# Patient Record
Sex: FEMALE | Race: WHITE | Employment: FULL TIME | URBAN - METROPOLITAN AREA
[De-identification: names, ages, dates, MRNs, and addresses within clinical notes are randomized per-mention and may not be internally consistent; named-entity substitution may affect disease eponyms.]

---

## 2023-02-20 ENCOUNTER — HOSPITAL ENCOUNTER (EMERGENCY)
Age: 28
Discharge: HOME | End: 2023-02-20
Attending: EMERGENCY MEDICINE
Payer: COMMERCIAL

## 2023-02-20 ENCOUNTER — APPOINTMENT (OUTPATIENT)
Dept: CT IMAGING | Age: 28
End: 2023-02-20
Payer: COMMERCIAL

## 2023-02-20 VITALS
HEART RATE: 80 BPM | SYSTOLIC BLOOD PRESSURE: 140 MMHG | TEMPERATURE: 98.2 F | HEIGHT: 68 IN | OXYGEN SATURATION: 100 % | BODY MASS INDEX: 37.44 KG/M2 | DIASTOLIC BLOOD PRESSURE: 85 MMHG | WEIGHT: 247 LBS | RESPIRATION RATE: 20 BRPM

## 2023-02-20 DIAGNOSIS — R19.7 DIARRHEA, UNSPECIFIED TYPE: Primary | ICD-10-CM

## 2023-02-20 DIAGNOSIS — K62.5 BRBPR (BRIGHT RED BLOOD PER RECTUM): ICD-10-CM

## 2023-02-20 DIAGNOSIS — R91.1 PULMONARY NODULE: ICD-10-CM

## 2023-02-20 LAB
A/G RATIO: 1.6 (ref 1.1–2.2)
ALBUMIN SERPL-MCNC: 4.6 G/DL (ref 3.4–5)
ALP BLD-CCNC: 85 U/L (ref 40–129)
ALT SERPL-CCNC: 27 U/L (ref 10–40)
ANION GAP SERPL CALCULATED.3IONS-SCNC: 11 MMOL/L (ref 3–16)
AST SERPL-CCNC: 22 U/L (ref 15–37)
BACTERIA: ABNORMAL /HPF
BASOPHILS ABSOLUTE: 0.1 K/UL (ref 0–0.2)
BASOPHILS RELATIVE PERCENT: 0.9 %
BILIRUB SERPL-MCNC: 0.4 MG/DL (ref 0–1)
BILIRUBIN URINE: ABNORMAL
BLOOD, URINE: ABNORMAL
BUN BLDV-MCNC: 11 MG/DL (ref 7–20)
CALCIUM SERPL-MCNC: 10.1 MG/DL (ref 8.3–10.6)
CHLORIDE BLD-SCNC: 102 MMOL/L (ref 99–110)
CLARITY: ABNORMAL
CO2: 26 MMOL/L (ref 21–32)
COLOR: YELLOW
CREAT SERPL-MCNC: 0.9 MG/DL (ref 0.6–1.1)
EOSINOPHILS ABSOLUTE: 0.1 K/UL (ref 0–0.6)
EOSINOPHILS RELATIVE PERCENT: 1.6 %
EPITHELIAL CELLS, UA: ABNORMAL /HPF (ref 0–5)
GFR SERPL CREATININE-BSD FRML MDRD: >60 ML/MIN/{1.73_M2}
GLUCOSE BLD-MCNC: 98 MG/DL (ref 70–99)
GLUCOSE URINE: NEGATIVE MG/DL
HCG(URINE) PREGNANCY TEST: NEGATIVE
HCT VFR BLD CALC: 40.9 % (ref 36–48)
HEMOGLOBIN: 14 G/DL (ref 12–16)
KETONES, URINE: ABNORMAL MG/DL
LEUKOCYTE ESTERASE, URINE: NEGATIVE
LIPASE: 16 U/L (ref 13–60)
LYMPHOCYTES ABSOLUTE: 2 K/UL (ref 1–5.1)
LYMPHOCYTES RELATIVE PERCENT: 22.3 %
MCH RBC QN AUTO: 30.2 PG (ref 26–34)
MCHC RBC AUTO-ENTMCNC: 34.1 G/DL (ref 31–36)
MCV RBC AUTO: 88.4 FL (ref 80–100)
MICROSCOPIC EXAMINATION: YES
MONOCYTES ABSOLUTE: 0.5 K/UL (ref 0–1.3)
MONOCYTES RELATIVE PERCENT: 6 %
NEUTROPHILS ABSOLUTE: 6.3 K/UL (ref 1.7–7.7)
NEUTROPHILS RELATIVE PERCENT: 69.2 %
NITRITE, URINE: NEGATIVE
OCCULT BLOOD DIAGNOSTIC: ABNORMAL
PDW BLD-RTO: 13.3 % (ref 12.4–15.4)
PH UA: 6 (ref 5–8)
PLATELET # BLD: 305 K/UL (ref 135–450)
PMV BLD AUTO: 7.3 FL (ref 5–10.5)
POTASSIUM REFLEX MAGNESIUM: 4.2 MMOL/L (ref 3.5–5.1)
PROTEIN UA: ABNORMAL MG/DL
RBC # BLD: 4.63 M/UL (ref 4–5.2)
RBC UA: ABNORMAL /HPF (ref 0–4)
SODIUM BLD-SCNC: 139 MMOL/L (ref 136–145)
SPECIFIC GRAVITY UA: 1.02 (ref 1–1.03)
TOTAL PROTEIN: 7.5 G/DL (ref 6.4–8.2)
URINE REFLEX TO CULTURE: YES
URINE TYPE: ABNORMAL
UROBILINOGEN, URINE: 0.2 E.U./DL
WBC # BLD: 9.1 K/UL (ref 4–11)
WBC UA: ABNORMAL /HPF (ref 0–5)

## 2023-02-20 PROCEDURE — 87086 URINE CULTURE/COLONY COUNT: CPT

## 2023-02-20 PROCEDURE — 83690 ASSAY OF LIPASE: CPT

## 2023-02-20 PROCEDURE — 74177 CT ABD & PELVIS W/CONTRAST: CPT

## 2023-02-20 PROCEDURE — 85025 COMPLETE CBC W/AUTO DIFF WBC: CPT

## 2023-02-20 PROCEDURE — 99285 EMERGENCY DEPT VISIT HI MDM: CPT

## 2023-02-20 PROCEDURE — 84703 CHORIONIC GONADOTROPIN ASSAY: CPT

## 2023-02-20 PROCEDURE — 6360000004 HC RX CONTRAST MEDICATION: Performed by: EMERGENCY MEDICINE

## 2023-02-20 PROCEDURE — 82270 OCCULT BLOOD FECES: CPT

## 2023-02-20 PROCEDURE — 81001 URINALYSIS AUTO W/SCOPE: CPT

## 2023-02-20 PROCEDURE — 80053 COMPREHEN METABOLIC PANEL: CPT

## 2023-02-20 RX ORDER — FLUOXETINE HYDROCHLORIDE 20 MG/1
80 CAPSULE ORAL DAILY
COMMUNITY
Start: 2015-02-27

## 2023-02-20 RX ORDER — BUPROPION HYDROCHLORIDE 150 MG/1
150 TABLET, EXTENDED RELEASE ORAL DAILY
COMMUNITY

## 2023-02-20 RX ADMIN — IOPAMIDOL 75 ML: 755 INJECTION, SOLUTION INTRAVENOUS at 17:21

## 2023-02-20 ASSESSMENT — PAIN SCALES - GENERAL: PAINLEVEL_OUTOF10: 6

## 2023-02-20 ASSESSMENT — PAIN DESCRIPTION - ORIENTATION: ORIENTATION: LOWER

## 2023-02-20 ASSESSMENT — PAIN - FUNCTIONAL ASSESSMENT
PAIN_FUNCTIONAL_ASSESSMENT: NONE - DENIES PAIN
PAIN_FUNCTIONAL_ASSESSMENT: 0-10

## 2023-02-20 ASSESSMENT — PAIN DESCRIPTION - LOCATION: LOCATION: ABDOMEN

## 2023-02-20 ASSESSMENT — PAIN DESCRIPTION - FREQUENCY: FREQUENCY: CONTINUOUS

## 2023-02-20 ASSESSMENT — PAIN DESCRIPTION - PAIN TYPE: TYPE: ACUTE PAIN

## 2023-02-20 NOTE — ED NOTES
Nursing assist MD with rectal exam, sample obtained patient tolerated well.      Costa Barraza RN  02/20/23 0731

## 2023-02-20 NOTE — ED PROVIDER NOTES
Emergency Department Provider Note  Location: [address] [place] [address]  2/20/2023     Patient Identification  Arleen Sun is a 28 y.o. female    Chief Complaint  Abdominal Pain          HPI  (History provided by patient)  Patient is a 62-year-old female who presents with abdominal cramping diarrhea and bright red blood per rectum started earlier this morning. No history of these symptoms. No history of hemorrhoids. Felt well yesterday. Woke up with severe generalized cramping more so on the left side. No exacerbating alleviating factors. Also lower back cramping. She reports that she developed multiple episodes of loose stool and became grossly bloody and now to the point where she is only having bright red blood coming out. No fevers no chills. No urinary symptoms. Last menstrual cycle was 2 weeks ago. She denies any vaginal bleeding or abnormal discharge. I have reviewed the following nursing documentation:  Allergies: No Known Allergies    Past medical history:  has no past medical history on file. Past surgical history:  has no past surgical history on file. Home medications:   Prior to Admission medications    Medication Sig Start Date End Date Taking? Authorizing Provider   FLUoxetine (PROZAC) 20 MG capsule Take 80 mg by mouth daily 2/27/15  Yes Historical Provider, MD   loperamide (ANTI-DIARRHEAL) 1 MG/5ML solution Take 5 mLs by mouth 3 times daily as needed for Diarrhea (only as needed for persistent diarrhea) 2/20/23 2/27/23 Yes Phu Mariee MD   buPROPion (WELLBUTRIN SR) 150 MG extended release tablet Take 150 mg by mouth daily    Historical Provider, MD       Social history:  reports that she has been smoking cigarettes. She has never used smokeless tobacco. She reports current alcohol use. She reports current drug use. Drug: Marijuana Shellye Burkitt). Family history:  History reviewed. No pertinent family history.           Exam  ED Triage Vitals [02/20/23 9269] BP Temp Temp Source Heart Rate Resp SpO2 Height Weight   (!) 140/85 98.2 °F (36.8 °C) Oral 80 20 100 % 5' 8\" (1.727 m) 247 lb (112 kg)       Physical Exam  Vitals and nursing note reviewed. Constitutional:       General: She is not in acute distress. Appearance: She is well-developed. HENT:      Head: Normocephalic and atraumatic. Nose: Nose normal. No congestion. Eyes:      General: No scleral icterus. Extraocular Movements: Extraocular movements intact. Cardiovascular:      Rate and Rhythm: Normal rate and regular rhythm. Heart sounds: No murmur heard. Pulmonary:      Effort: Pulmonary effort is normal.      Breath sounds: Normal breath sounds. Abdominal:      General: There is no distension. Palpations: Abdomen is soft. Comments: Mildly tender periumbilically otherwise no guarding or rebound no CVA tenderness   Genitourinary:     Comments: Trace blood no significant stool sample no obvious hemorrhoids no anal fissure noted  Musculoskeletal:         General: No deformity. Normal range of motion. Cervical back: Normal range of motion and neck supple. Skin:     General: Skin is warm. Findings: No rash. Neurological:      Mental Status: She is alert and oriented to person, place, and time. Motor: No abnormal muscle tone. Coordination: Coordination normal.   Psychiatric:         Mood and Affect: Mood normal.         Behavior: Behavior normal.         MDM/ED Course    ED Medication Orders (From admission, onward)      Start Ordered     Status Ordering Provider    02/20/23 1708 02/20/23 1709  iopamidol (ISOVUE-370) 76 % injection 75 mL  IMG ONCE PRN         Last MAR action: Given - by Nick Riddle on 02/20/23 at [address] [place] [address], [id number] Rahul VILLALPANDO                Radiology  CT ABDOMEN PELVIS W IV CONTRAST Additional Contrast? None    Result Date: 2/20/2023  CT abdomen and pelvis with contrast HISTORY: Diarrhea. Abdominal pain. Bright red blood per rectum.  COMPARISON: none CONTRAST:  75 mL Isovue-370 intravenous  TECHNIQUE: Individualized dose optimization technique was used in order to meet ALARA standards for radiation dose reduction. In addition to vendor specific dose reduction algorithms, the dose reduction techniques vary based on the specific scanner utilized but frequently include automated exposure control, adjustment of the mA and/or kV according to patient size, and use of iterative reconstruction technique. COMMENTS: Mild degenerative changes in the spine. 5 mm subpleural pulmonary nodule right lower lobe image 2-19. In the absence of risk factor for lung cancer no follow-up is required. If there are risk factors then 12 month chest CT can be obtained. There is colonic diverticulosis. Normal appendix. No anorectal abnormality seen. No lymphadenopathy. Gallbladder is within normal limits. Solid abdominal organs are otherwise unremarkable. Bladder and uterus within normal limits. No cause for acute pain or rectal bleeding identified. 5 mm pulmonary nodule. Bedside Ultrasound  No results found.        Labs  Results for orders placed or performed during the hospital encounter of 02/20/23   Urinalysis with Reflex to Culture    Specimen: Urine   Result Value Ref Range    Color, UA Yellow Straw/Yellow    Clarity, UA SL CLOUDY (A) Clear    Glucose, Ur Negative Negative mg/dL    Bilirubin Urine SMALL (A) Negative    Ketones, Urine TRACE (A) Negative mg/dL    Specific Gravity, UA 1.025 1.005 - 1.030    Blood, Urine TRACE-LYSED (A) Negative    pH, UA 6.0 5.0 - 8.0    Protein, UA TRACE (A) Negative mg/dL    Urobilinogen, Urine 0.2 <2.0 E.U./dL    Nitrite, Urine Negative Negative    Leukocyte Esterase, Urine Negative Negative    Microscopic Examination YES     Urine Type NotGiven     Urine Reflex to Culture Yes    Urine Preg (Lab)   Result Value Ref Range    HCG(Urine) Pregnancy Test Negative Detects HCG level >20 MIU/mL   CBC with Auto Differential   Result Value Ref Range    WBC 9.1 4.0 - 11.0 K/uL    RBC 4.63 4.00 - 5.20 M/uL    Hemoglobin 14.0 12.0 - 16.0 g/dL    Hematocrit 40.9 36.0 - 48.0 %    MCV 88.4 80.0 - 100.0 fL    MCH 30.2 26.0 - 34.0 pg    MCHC 34.1 31.0 - 36.0 g/dL    RDW 13.3 12.4 - 15.4 %    Platelets 528 216 - 000 K/uL    MPV 7.3 5.0 - 10.5 fL    Neutrophils % 69.2 %    Lymphocytes % 22.3 %    Monocytes % 6.0 %    Eosinophils % 1.6 %    Basophils % 0.9 %    Neutrophils Absolute 6.3 1.7 - 7.7 K/uL    Lymphocytes Absolute 2.0 1.0 - 5.1 K/uL    Monocytes Absolute 0.5 0.0 - 1.3 K/uL    Eosinophils Absolute 0.1 0.0 - 0.6 K/uL    Basophils Absolute 0.1 0.0 - 0.2 K/uL   CMP w/ Reflex to MG   Result Value Ref Range    Sodium 139 136 - 145 mmol/L    Potassium reflex Magnesium 4.2 3.5 - 5.1 mmol/L    Chloride 102 99 - 110 mmol/L    CO2 26 21 - 32 mmol/L    Anion Gap 11 3 - 16    Glucose 98 70 - 99 mg/dL    BUN 11 7 - 20 mg/dL    Creatinine 0.9 0.6 - 1.1 mg/dL    Est, Glom Filt Rate >60 >60    Calcium 10.1 8.3 - 10.6 mg/dL    Total Protein 7.5 6.4 - 8.2 g/dL    Albumin 4.6 3.4 - 5.0 g/dL    Albumin/Globulin Ratio 1.6 1.1 - 2.2    Total Bilirubin 0.4 0.0 - 1.0 mg/dL    Alkaline Phosphatase 85 40 - 129 U/L    ALT 27 10 - 40 U/L    AST 22 15 - 37 U/L   Lipase   Result Value Ref Range    Lipase 16.0 13.0 - 60.0 U/L   Blood Occult Stool Diagnostic   Result Value Ref Range    Occult Blood Diagnostic Result: POSITIVE  Normal range: Negative   (A)    Microscopic Urinalysis   Result Value Ref Range    WBC, UA 10-20 (A) 0 - 5 /HPF    RBC, UA 0-2 0 - 4 /HPF    Epithelial Cells, UA 11-20 (A) 0 - 5 /HPF    Bacteria, UA 4+ (A) None Seen /HPF         Procedures  Procedures      Patient seen and evaluated. Relevant records reviewed. - Patient is 28 y.o. female presented for acute diarrhea abdominal cramping and bright red blood per rectum in setting or chronic conditions none.   - Exam showed overall well-appearing female no acute distress vitals notable for borderline elevated blood pressure systolic 630 otherwise vitals within normal limits. She has a soft abdomen mild tenderness. She has trace red blood on LUCI but no melena no evidence of hemorrhoid or ongoing bleed. - Patient was placed on telemetry during his/her ED stay and no malignant dysrhythmia observed. - Diagnostic studies reviewed. - Lab: CBC does not show any evidence of anemia or thrombocytopenia did not show clinically significant pathology. CT abdomen pelvis does not show any obvious inflammatory pathology diverticulitis mass or any other concerning finding. Incidental pulmonary nodules noted which I discussed with the patient.  -At this point I feel that the patient is low risk for decompensated lower GI bleed and is appropriate for discharge with GI follow-up. - Is this patient to be included in the SEP-1 Core Measure due to severe sepsis or septic shock? No   Exclusion criteria - the patient is NOT to be included for SEP-1 Core Measure due to: Infection is not suspected    - Patient was given    ED Medication Orders (From admission, onward)      Start Ordered     Status Ordering Provider    02/20/23 1708 02/20/23 1709  iopamidol (ISOVUE-370) 76 % injection 75 mL  IMG ONCE PRN         Last MAR action: Given - by Juana Signs on 02/20/23 at [address], Kacey [place] [address] discussed the results with patient/family including incidental findings. We agreed to discharge with pending stool tests and GI follow-up. Patient has insurance and reports that she has PCP and is able to arrange transport for follow-up  - At this point I do not see indication for further work-up in the ER, as it is unlikely  and poses more risk than benefit. - Return precautions also discussed. Patient/family verbalized understanding of care plan and agreeable to plan. Clinical Impression:  1. Diarrhea, unspecified type    2. BRBPR (bright red blood per rectum)    3.  Pulmonary nodule Disposition:  Discharge to home in good condition. Blood pressure (!) 140/85, pulse 80, temperature 98.2 °F (36.8 °C), temperature source Oral, resp. rate 20, height 5' 8\" (1.727 m), weight 247 lb (112 kg), SpO2 100 %. Patient was given scripts for the following medications. I counseled patient how to take these medications. New Prescriptions    LOPERAMIDE (ANTI-DIARRHEAL) 1 MG/5ML SOLUTION    Take 5 mLs by mouth 3 times daily as needed for Diarrhea (only as needed for persistent diarrhea)     Modified Medications    No medications on file       Disposition referral (if applicable):  Porsche Loja MD  [address] [facility], [address]    Schedule an appointment as soon as possible for a visit       I, Pat Pineda, am the primary attending of record and contributed the majority of evaluation and treatment of emergent care for this encounter. Total critical care time is 0 minutes, which excludes separately billable procedures and updating family. Time spent is specifically for management of the presenting complaint and symptoms initially, direct bedside care, reevaluation, review of records, and consultation. There was a high probability of clinically significant life-threatening deterioration in the patient's condition, which required my urgent intervention. This chart was generated in part by using Dragon Dictation system and may contain errors related to that system including errors in grammar, punctuation, and spelling, as well as words and phrases that may be inappropriate. If there are any questions or concerns please feel free to contact the dictating provider for clarification.      Phu Mariee MD  [address] Mcgee MD  02/20/23 3418

## 2023-02-20 NOTE — ED NOTES
Patient given prescription,discharge instructions verbal and written, patient verbalized understanding. Alert/oriented X4, Clear speech.   Patient exhibits no distress, ambulates with steady gait per self leaving unit, no further request.      Mau Flores RN  02/20/23 5968

## 2023-02-20 NOTE — DISCHARGE INSTRUCTIONS
You were seen in the emergency department for diarrhea and bright red blood per rectum. Your lab work and your imaging is overall reassuring. We are prescribing you an antidiarrhea medication which she should take only as needed for severe symptoms otherwise recommend that you hydrate as we discussed. You have follow-up stool studies that you should submit any need to follow-up with a GI doctor. Return to emergency department with any emergent concerns as we discussed. Lung Nodules    Your x-ray or CT scan shows a nodule (\"spot\") on your lung. This will require follow-up for further evaluation. Please call your Primary Care Physician (PCP) if you have already established one and you confirmed your PCP during your ER visit today. If you do not have a PCP, please call the [address] [place] [address] scheduling number to schedule your Emergency Department follow up visit. Please mention that you are scheduling an \"ER follow up visit\" for a lung nodule. Learning About Lung Nodules  What is a lung nodule? A lung nodule is a growth in the lung. A single nodule surrounded by lung tissue is called a solitary pulmonary nodule. A lung nodule might not cause any symptoms. Your doctor may have found one or more nodules on your lung when you were having a chest X-ray or CT scan. Or it may have been found during a lung cancer screening. A lung nodule may be caused by an old infection or cancer. It might also be a noncancerous growth. Lung nodules can cause a screening to give an abnormal result. Most nodules do not cause any harm. But without further tests, your doctor can't tell whether an abnormal finding is cancer, a harmless nodule, or something else. What can you expect when you have a lung nodule? Your doctor will look at several risk factors to see how likely it is that the nodule is cancer. He or she will look at: Whether you smoke or have ever smoked.   Your age and your family's medical history. Whether you have ever had lung cancer. The size and shape of the nodule. Whether the nodule has changed in size. Your doctor may look at past chest X-rays or CT scans, if available, and compare them. Or you may have a series of CT scans to see if the nodule grows over time. What happens next depends on the risk of the nodule being cancer. If you have no risk factors and the nodule is small, your doctor may advise doing nothing. If the risk is small, your doctor may schedule follow-up appointments and tests. You may have more CT scans later to see if the nodule is growing. If the nodule hasn't changed in 3 to 6 months, you may have CT scans every year. If it hasn't changed in 2 years, you may not need any more tests. If there's a higher risk of cancer, your doctor may:  Do a PET scan, which may help tell if the nodule is cancerous or not. Take a sample of tissue from the nodule for testing. This is called a biopsy. Remove the nodule with surgery. Follow-up care is a key part of your treatment and safety. Be sure to make and go to all appointments, and call your doctor if you are having problems. It's also a good idea to know your test results and keep a list of the medicines you take. What is the next step in evaluation of a lung nodule? Below are the recommended guidelines for management of pulmonary nodules, you can discuss these recommendations at your follow-up appointment:     Nodule size less than or equal to ?4 mm  In a low-risk patient, no follow-up needed. In a high-risk patient, follow-up CT at 12 months; if unchanged, no further follow-up. Nodule size equals >4-6 mm  In a low-risk patient, follow-up CT at 12 months; if unchanged, no further follow-up. In a high-risk patient, initial follow-up CT at 6-12 months then at 18-24 months if no change. Nodule size equals >6-8 mm  In a low-risk patient, initial follow-up CT at 6-12 months then at 18-24 months if no change.   In a high-risk patient, initial follow-up CT at 3-6 months then at 9-12 months and 24 months if no change. Nodule size greater than >8 mm  In low-risk and high-risk patients follow-up CT at around 3, 9, and 24 months, contrast-enhanced CT, PET, and/or biopsy.

## 2023-02-20 NOTE — ED NOTES
Patient to ed with complaints of bright rectal blood after multiple loose stools today, patient also reports some lower abd cramping.      Tk Salas RN  02/20/23 1077

## 2023-02-21 LAB — URINE CULTURE, ROUTINE: NORMAL

## 2024-03-24 ENCOUNTER — HOSPITAL ENCOUNTER (INPATIENT)
Age: 29
LOS: 4 days | Discharge: HOME | DRG: 152 | End: 2024-03-28
Attending: EMERGENCY MEDICINE | Admitting: INTERNAL MEDICINE
Payer: COMMERCIAL

## 2024-03-24 ENCOUNTER — APPOINTMENT (OUTPATIENT)
Dept: GENERAL RADIOLOGY | Age: 29
DRG: 152 | End: 2024-03-24
Payer: COMMERCIAL

## 2024-03-24 ENCOUNTER — APPOINTMENT (OUTPATIENT)
Dept: CT IMAGING | Age: 29
DRG: 152 | End: 2024-03-24
Payer: COMMERCIAL

## 2024-03-24 DIAGNOSIS — J96.01 ACUTE HYPOXIC RESPIRATORY FAILURE (HCC): ICD-10-CM

## 2024-03-24 DIAGNOSIS — R06.1 STRIDOR: Primary | ICD-10-CM

## 2024-03-24 DIAGNOSIS — J06.9 ACUTE UPPER RESPIRATORY INFECTION: ICD-10-CM

## 2024-03-24 PROBLEM — R06.00 DYSPNEA: Status: ACTIVE | Noted: 2024-03-24

## 2024-03-24 LAB
ALBUMIN SERPL-MCNC: 4.5 G/DL (ref 3.4–5)
ALBUMIN/GLOB SERPL: 1.5 {RATIO} (ref 1.1–2.2)
ALP SERPL-CCNC: 85 U/L (ref 40–129)
ALT SERPL-CCNC: 23 U/L (ref 10–40)
ANION GAP SERPL CALCULATED.3IONS-SCNC: 15 MMOL/L (ref 3–16)
AST SERPL-CCNC: 28 U/L (ref 15–37)
BASE EXCESS BLDA CALC-SCNC: -2.1 MMOL/L (ref -3–3)
BASOPHILS # BLD: 0.1 K/UL (ref 0–0.2)
BASOPHILS NFR BLD: 0.7 %
BILIRUB SERPL-MCNC: <0.2 MG/DL (ref 0–1)
BUN SERPL-MCNC: 8 MG/DL (ref 7–20)
CALCIUM SERPL-MCNC: 9.3 MG/DL (ref 8.3–10.6)
CHLORIDE SERPL-SCNC: 101 MMOL/L (ref 99–110)
CO2 BLDA-SCNC: 51.7 MMOL/L
CO2 SERPL-SCNC: 19 MMOL/L (ref 21–32)
COHGB MFR BLDA: 0.9 % (ref 0–1.5)
CREAT SERPL-MCNC: 0.7 MG/DL (ref 0.6–1.1)
DEPRECATED RDW RBC AUTO: 13.8 % (ref 12.4–15.4)
EOSINOPHIL # BLD: 0.5 K/UL (ref 0–0.6)
EOSINOPHIL NFR BLD: 3.6 %
FLUAV RNA RESP QL NAA+PROBE: NOT DETECTED
FLUBV RNA RESP QL NAA+PROBE: NOT DETECTED
GFR SERPLBLD CREATININE-BSD FMLA CKD-EPI: >60 ML/MIN/{1.73_M2}
GLUCOSE SERPL-MCNC: 140 MG/DL (ref 70–99)
HCG SERPL QL: NEGATIVE
HCO3 BLDA-SCNC: 22 MMOL/L (ref 21–29)
HCT VFR BLD AUTO: 44.3 % (ref 36–48)
HGB BLD-MCNC: 14.8 G/DL (ref 12–16)
HGB BLDA-MCNC: 13.9 G/DL (ref 12–16)
LACTATE BLDV-SCNC: 2.6 MMOL/L (ref 0.4–1.9)
LYMPHOCYTES # BLD: 2.4 K/UL (ref 1–5.1)
LYMPHOCYTES NFR BLD: 17 %
MCH RBC QN AUTO: 30.4 PG (ref 26–34)
MCHC RBC AUTO-ENTMCNC: 33.5 G/DL (ref 31–36)
MCV RBC AUTO: 90.7 FL (ref 80–100)
METHGB MFR BLDA: 0.3 %
MONOCYTES # BLD: 0.6 K/UL (ref 0–1.3)
MONOCYTES NFR BLD: 4.6 %
NEUTROPHILS # BLD: 10.5 K/UL (ref 1.7–7.7)
NEUTROPHILS NFR BLD: 74.1 %
O2 THERAPY: ABNORMAL
PCO2 BLDA: 35 MMHG (ref 35–45)
PH BLDA: 7.41 [PH] (ref 7.35–7.45)
PLATELET # BLD AUTO: 370 K/UL (ref 135–450)
PMV BLD AUTO: 7.5 FL (ref 5–10.5)
PO2 BLDA: 124 MMHG (ref 75–108)
POTASSIUM SERPL-SCNC: 4.5 MMOL/L (ref 3.5–5.1)
PROT SERPL-MCNC: 7.6 G/DL (ref 6.4–8.2)
RBC # BLD AUTO: 4.88 M/UL (ref 4–5.2)
REPORT: NORMAL
RESP PATH DNA+RNA PNL NPH NAA+NON-PROBE: NORMAL
S PYO AG THROAT QL: NEGATIVE
SAO2 % BLDA: 99.6 %
SARS-COV-2 RNA RESP QL NAA+PROBE: NOT DETECTED
SODIUM SERPL-SCNC: 135 MMOL/L (ref 136–145)
WBC # BLD AUTO: 14.2 K/UL (ref 4–11)

## 2024-03-24 PROCEDURE — 6370000000 HC RX 637 (ALT 250 FOR IP): Performed by: INTERNAL MEDICINE

## 2024-03-24 PROCEDURE — 2700000000 HC OXYGEN THERAPY PER DAY

## 2024-03-24 PROCEDURE — 99285 EMERGENCY DEPT VISIT HI MDM: CPT

## 2024-03-24 PROCEDURE — 87040 BLOOD CULTURE FOR BACTERIA: CPT

## 2024-03-24 PROCEDURE — 6360000002 HC RX W HCPCS: Performed by: STUDENT IN AN ORGANIZED HEALTH CARE EDUCATION/TRAINING PROGRAM

## 2024-03-24 PROCEDURE — 36415 COLL VENOUS BLD VENIPUNCTURE: CPT

## 2024-03-24 PROCEDURE — 96361 HYDRATE IV INFUSION ADD-ON: CPT

## 2024-03-24 PROCEDURE — 2580000003 HC RX 258: Performed by: STUDENT IN AN ORGANIZED HEALTH CARE EDUCATION/TRAINING PROGRAM

## 2024-03-24 PROCEDURE — 87636 SARSCOV2 & INF A&B AMP PRB: CPT

## 2024-03-24 PROCEDURE — 71045 X-RAY EXAM CHEST 1 VIEW: CPT

## 2024-03-24 PROCEDURE — 94761 N-INVAS EAR/PLS OXIMETRY MLT: CPT

## 2024-03-24 PROCEDURE — 87081 CULTURE SCREEN ONLY: CPT

## 2024-03-24 PROCEDURE — 96365 THER/PROPH/DIAG IV INF INIT: CPT

## 2024-03-24 PROCEDURE — 85025 COMPLETE CBC W/AUTO DIFF WBC: CPT

## 2024-03-24 PROCEDURE — 2580000003 HC RX 258: Performed by: INTERNAL MEDICINE

## 2024-03-24 PROCEDURE — 94640 AIRWAY INHALATION TREATMENT: CPT

## 2024-03-24 PROCEDURE — 2000000000 HC ICU R&B

## 2024-03-24 PROCEDURE — 96375 TX/PRO/DX INJ NEW DRUG ADDON: CPT

## 2024-03-24 PROCEDURE — 84703 CHORIONIC GONADOTROPIN ASSAY: CPT

## 2024-03-24 PROCEDURE — 0202U NFCT DS 22 TRGT SARS-COV-2: CPT

## 2024-03-24 PROCEDURE — 82803 BLOOD GASES ANY COMBINATION: CPT

## 2024-03-24 PROCEDURE — 87880 STREP A ASSAY W/OPTIC: CPT

## 2024-03-24 PROCEDURE — 2500000003 HC RX 250 WO HCPCS: Performed by: INTERNAL MEDICINE

## 2024-03-24 PROCEDURE — 99223 1ST HOSP IP/OBS HIGH 75: CPT | Performed by: STUDENT IN AN ORGANIZED HEALTH CARE EDUCATION/TRAINING PROGRAM

## 2024-03-24 PROCEDURE — 6370000000 HC RX 637 (ALT 250 FOR IP): Performed by: FAMILY MEDICINE

## 2024-03-24 PROCEDURE — 93005 ELECTROCARDIOGRAM TRACING: CPT | Performed by: EMERGENCY MEDICINE

## 2024-03-24 PROCEDURE — 6360000002 HC RX W HCPCS: Performed by: PHYSICIAN ASSISTANT

## 2024-03-24 PROCEDURE — 2580000003 HC RX 258: Performed by: PHYSICIAN ASSISTANT

## 2024-03-24 PROCEDURE — 6360000004 HC RX CONTRAST MEDICATION: Performed by: EMERGENCY MEDICINE

## 2024-03-24 PROCEDURE — 80053 COMPREHEN METABOLIC PANEL: CPT

## 2024-03-24 PROCEDURE — 83605 ASSAY OF LACTIC ACID: CPT

## 2024-03-24 PROCEDURE — 70491 CT SOFT TISSUE NECK W/DYE: CPT

## 2024-03-24 PROCEDURE — 6360000002 HC RX W HCPCS: Performed by: INTERNAL MEDICINE

## 2024-03-24 RX ORDER — IPRATROPIUM BROMIDE AND ALBUTEROL SULFATE 2.5; .5 MG/3ML; MG/3ML
1 SOLUTION RESPIRATORY (INHALATION)
Status: DISCONTINUED | OUTPATIENT
Start: 2024-03-24 | End: 2024-03-24

## 2024-03-24 RX ORDER — 0.9 % SODIUM CHLORIDE 0.9 %
1000 INTRAVENOUS SOLUTION INTRAVENOUS ONCE
Status: COMPLETED | OUTPATIENT
Start: 2024-03-24 | End: 2024-03-24

## 2024-03-24 RX ORDER — ENOXAPARIN SODIUM 100 MG/ML
40 INJECTION SUBCUTANEOUS DAILY
Status: DISCONTINUED | OUTPATIENT
Start: 2024-03-24 | End: 2024-03-25

## 2024-03-24 RX ORDER — SODIUM CHLORIDE FOR INHALATION 0.9 %
3 VIAL, NEBULIZER (ML) INHALATION EVERY 4 HOURS PRN
Status: DISCONTINUED | OUTPATIENT
Start: 2024-03-24 | End: 2024-03-28 | Stop reason: HOSPADM

## 2024-03-24 RX ORDER — BUPROPION HYDROCHLORIDE 150 MG/1
150 TABLET, EXTENDED RELEASE ORAL DAILY
Status: DISCONTINUED | OUTPATIENT
Start: 2024-03-24 | End: 2024-03-28 | Stop reason: HOSPADM

## 2024-03-24 RX ORDER — DEXAMETHASONE SODIUM PHOSPHATE 10 MG/ML
10 INJECTION INTRAMUSCULAR; INTRAVENOUS ONCE
Status: COMPLETED | OUTPATIENT
Start: 2024-03-24 | End: 2024-03-24

## 2024-03-24 RX ORDER — POTASSIUM CHLORIDE 20 MEQ/1
40 TABLET, EXTENDED RELEASE ORAL PRN
Status: DISCONTINUED | OUTPATIENT
Start: 2024-03-24 | End: 2024-03-28 | Stop reason: HOSPADM

## 2024-03-24 RX ORDER — MAGNESIUM SULFATE IN WATER 40 MG/ML
2000 INJECTION, SOLUTION INTRAVENOUS PRN
Status: DISCONTINUED | OUTPATIENT
Start: 2024-03-24 | End: 2024-03-28 | Stop reason: HOSPADM

## 2024-03-24 RX ORDER — LEVOFLOXACIN 5 MG/ML
750 INJECTION, SOLUTION INTRAVENOUS EVERY 24 HOURS
Status: COMPLETED | OUTPATIENT
Start: 2024-03-24 | End: 2024-03-27

## 2024-03-24 RX ORDER — POTASSIUM CHLORIDE 7.45 MG/ML
10 INJECTION INTRAVENOUS PRN
Status: DISCONTINUED | OUTPATIENT
Start: 2024-03-24 | End: 2024-03-28 | Stop reason: HOSPADM

## 2024-03-24 RX ORDER — FLUOXETINE HYDROCHLORIDE 20 MG/1
40 CAPSULE ORAL DAILY
Status: DISCONTINUED | OUTPATIENT
Start: 2024-03-24 | End: 2024-03-28 | Stop reason: HOSPADM

## 2024-03-24 RX ORDER — ONDANSETRON 2 MG/ML
4 INJECTION INTRAMUSCULAR; INTRAVENOUS EVERY 6 HOURS PRN
Status: DISCONTINUED | OUTPATIENT
Start: 2024-03-24 | End: 2024-03-28 | Stop reason: HOSPADM

## 2024-03-24 RX ORDER — POLYETHYLENE GLYCOL 3350 17 G/17G
17 POWDER, FOR SOLUTION ORAL DAILY PRN
Status: DISCONTINUED | OUTPATIENT
Start: 2024-03-24 | End: 2024-03-28 | Stop reason: HOSPADM

## 2024-03-24 RX ORDER — SODIUM CHLORIDE 0.9 % (FLUSH) 0.9 %
10 SYRINGE (ML) INJECTION EVERY 12 HOURS SCHEDULED
Status: DISCONTINUED | OUTPATIENT
Start: 2024-03-24 | End: 2024-03-28 | Stop reason: HOSPADM

## 2024-03-24 RX ORDER — ACETAMINOPHEN 325 MG/1
650 TABLET ORAL EVERY 6 HOURS PRN
Status: DISCONTINUED | OUTPATIENT
Start: 2024-03-24 | End: 2024-03-28 | Stop reason: HOSPADM

## 2024-03-24 RX ORDER — ALBUTEROL SULFATE 2.5 MG/3ML
2.5 SOLUTION RESPIRATORY (INHALATION) EVERY 4 HOURS
Status: DISCONTINUED | OUTPATIENT
Start: 2024-03-24 | End: 2024-03-25

## 2024-03-24 RX ORDER — SODIUM CHLORIDE, SODIUM LACTATE, POTASSIUM CHLORIDE, AND CALCIUM CHLORIDE .6; .31; .03; .02 G/100ML; G/100ML; G/100ML; G/100ML
1000 INJECTION, SOLUTION INTRAVENOUS ONCE
Status: COMPLETED | OUTPATIENT
Start: 2024-03-24 | End: 2024-03-24

## 2024-03-24 RX ORDER — SODIUM CHLORIDE 9 MG/ML
INJECTION, SOLUTION INTRAVENOUS CONTINUOUS
Status: DISCONTINUED | OUTPATIENT
Start: 2024-03-24 | End: 2024-03-25

## 2024-03-24 RX ORDER — TRAZODONE HYDROCHLORIDE 50 MG/1
50 TABLET ORAL NIGHTLY
COMMUNITY

## 2024-03-24 RX ORDER — ACETAMINOPHEN 650 MG/1
650 SUPPOSITORY RECTAL EVERY 6 HOURS PRN
Status: DISCONTINUED | OUTPATIENT
Start: 2024-03-24 | End: 2024-03-28 | Stop reason: HOSPADM

## 2024-03-24 RX ORDER — ONDANSETRON 4 MG/1
4 TABLET, ORALLY DISINTEGRATING ORAL EVERY 8 HOURS PRN
Status: DISCONTINUED | OUTPATIENT
Start: 2024-03-24 | End: 2024-03-28 | Stop reason: HOSPADM

## 2024-03-24 RX ORDER — SODIUM CHLORIDE 9 MG/ML
INJECTION, SOLUTION INTRAVENOUS PRN
Status: DISCONTINUED | OUTPATIENT
Start: 2024-03-24 | End: 2024-03-28 | Stop reason: HOSPADM

## 2024-03-24 RX ORDER — TRAZODONE HYDROCHLORIDE 50 MG/1
50 TABLET ORAL
Status: COMPLETED | OUTPATIENT
Start: 2024-03-24 | End: 2024-03-24

## 2024-03-24 RX ORDER — SODIUM CHLORIDE 0.9 % (FLUSH) 0.9 %
10 SYRINGE (ML) INJECTION PRN
Status: DISCONTINUED | OUTPATIENT
Start: 2024-03-24 | End: 2024-03-28 | Stop reason: HOSPADM

## 2024-03-24 RX ADMIN — ENOXAPARIN SODIUM 40 MG: 100 INJECTION SUBCUTANEOUS at 14:50

## 2024-03-24 RX ADMIN — WATER 40 MG: 1 INJECTION INTRAMUSCULAR; INTRAVENOUS; SUBCUTANEOUS at 14:50

## 2024-03-24 RX ADMIN — ALBUTEROL SULFATE 2.5 MG: 2.5 SOLUTION RESPIRATORY (INHALATION) at 20:08

## 2024-03-24 RX ADMIN — ALBUTEROL SULFATE 2.5 MG: 2.5 SOLUTION RESPIRATORY (INHALATION) at 16:59

## 2024-03-24 RX ADMIN — LEVOFLOXACIN 750 MG: 5 INJECTION, SOLUTION INTRAVENOUS at 14:34

## 2024-03-24 RX ADMIN — DEXAMETHASONE SODIUM PHOSPHATE 10 MG: 10 INJECTION INTRAMUSCULAR; INTRAVENOUS at 09:11

## 2024-03-24 RX ADMIN — SODIUM CHLORIDE 3000 MG: 900 INJECTION INTRAVENOUS at 09:31

## 2024-03-24 RX ADMIN — TRAZODONE HYDROCHLORIDE 50 MG: 50 TABLET ORAL at 21:47

## 2024-03-24 RX ADMIN — THIAMINE HYDROCHLORIDE: 100 INJECTION, SOLUTION INTRAMUSCULAR; INTRAVENOUS at 14:12

## 2024-03-24 RX ADMIN — ACETAMINOPHEN 650 MG: 325 TABLET ORAL at 16:31

## 2024-03-24 RX ADMIN — SODIUM CHLORIDE, POTASSIUM CHLORIDE, SODIUM LACTATE AND CALCIUM CHLORIDE 1000 ML: 600; 310; 30; 20 INJECTION, SOLUTION INTRAVENOUS at 09:14

## 2024-03-24 RX ADMIN — SODIUM CHLORIDE 1000 ML: 9 INJECTION, SOLUTION INTRAVENOUS at 11:51

## 2024-03-24 RX ADMIN — SODIUM CHLORIDE, PRESERVATIVE FREE 10 ML: 5 INJECTION INTRAVENOUS at 19:57

## 2024-03-24 RX ADMIN — IPRATROPIUM BROMIDE AND ALBUTEROL SULFATE 1 DOSE: .5; 3 SOLUTION RESPIRATORY (INHALATION) at 13:28

## 2024-03-24 RX ADMIN — FLUOXETINE HYDROCHLORIDE 40 MG: 20 CAPSULE ORAL at 14:50

## 2024-03-24 RX ADMIN — WATER 40 MG: 1 INJECTION INTRAMUSCULAR; INTRAVENOUS; SUBCUTANEOUS at 23:06

## 2024-03-24 RX ADMIN — SODIUM CHLORIDE: 9 INJECTION, SOLUTION INTRAVENOUS at 14:08

## 2024-03-24 RX ADMIN — IOPAMIDOL 75 ML: 755 INJECTION, SOLUTION INTRAVENOUS at 10:32

## 2024-03-24 RX ADMIN — BUPROPION HYDROCHLORIDE 150 MG: 150 TABLET, FILM COATED, EXTENDED RELEASE ORAL at 14:50

## 2024-03-24 ASSESSMENT — PAIN SCALES - GENERAL
PAINLEVEL_OUTOF10: 0
PAINLEVEL_OUTOF10: 0
PAINLEVEL_OUTOF10: 6

## 2024-03-24 ASSESSMENT — PAIN DESCRIPTION - LOCATION: LOCATION: HEAD

## 2024-03-24 NOTE — H&P
Negative 02/20/2023 04:19 PM    WBCUA 10-20 02/20/2023 04:19 PM    BACTERIA 4+ 02/20/2023 04:19 PM    RBCUA 0-2 02/20/2023 04:19 PM    BLOODU TRACE-LYSED 02/20/2023 04:19 PM    SPECGRAV 1.025 02/20/2023 04:19 PM    GLUCOSEU Negative 02/20/2023 04:19 PM       Radiology:     CXR: I have reviewed the CXR with the following interpretation: nad   EKG: sinus tachycardia     CT SOFT TISSUE NECK W CONTRAST   Final Result   No apparent acute abnormality of the soft tissue structures of the neck.         XR CHEST PORTABLE   Final Result   No significant findings in the chest.             ASSESSMENT/PLAN:    Active Hospital Problems    Diagnosis Date Noted    Dyspnea [R06.00] 03/24/2024       Acute Medical Issues Being Addressed:    29-year-old lady admitted to the hospital with dyspnea    Dyspnea secondary to severe bronchoconstriction probably from underlying URI viral versus bacterial  CT of the neck was essentially unremarkable  Chest x-ray negative  Patient unable to complete full sentences but I do not see any stridor  There is some scattered wheeze but air movement is present  Will get an EKG  N.p.o.  IV fluids  Will admit to ICU  Methylprednisone 60 IV every 6  Pregnancy test is negative  Start IV levofloxacin  Will use DuoNebs Q3  Closely monitor if worsens may need intubation  Critical care consult    Obesity    Depression anxiety  Continue home medication    Lactic acidosis secondary to dehydration  IV fluid bolus given will give another bolus and start maintenance IV fluids    Daily alcohol intake  Start rally pack      DVT Prophylaxis: sc lovenox   Diet: No diet orders on file  Code Status: No Order    PT/OT Eval Status: will have eval if appropriate given patient's condition    Dispo - once acute medical process is resolved       Omer Gu MD    Thank you Satnam Aburto MD for the opportunity to be involved in this patient's care. If you have any questions or concerns please feel free to contact me.

## 2024-03-24 NOTE — CONSULTS
Pulmonary and Critical Care Medicine    CC: SOB   Date: 3/24/2024  Admit Date:  3/24/2024  Reason for Consultation: severe bronchoconstriction   Consult Requesting Physician: Omer Gu MD     HPI     This is a 30 y/o F w/ a hx of marijuana smoke who presented with SOB. Patient mentions she was at her usual health until 3/19 when she started having sinus pressures and nasal congestion, over the next two days she started having change in voice and felt like she had mucus at the back of her throat but until to bring it up. She was seen at urgent care given abx without help. She mentions he symptoms progressed to SOB. On my exam she was in room ICU 2, she has hoarse voice, she has bilateral expiratory wheezing, at time when she speaks there is strider.     ROS: negative except stated above    PMH:  has no past medical history on file.   PSH:  has no past surgical history on file.    SH:  reports that she has been smoking cigarettes. She has never used smokeless tobacco. She reports current alcohol use. She reports current drug use. Drug: Marijuana (Weed).   FH: family history is not on file.    Allergies: Patient has no known allergies.     OBJECTIVE DATA       PHYSICAL EXAM:   Pulse  Av.5  Min: 89  Max: 135  BP  Min: 106/85  Max: 141/79  SpO2  Av.3 %  Min: 96 %  Max: 100 %    General: NAD  Neuro: A0x3  Lung: bilateral expiratory wheezing, equal non labored, hoarse voice, strider when breathing fast, speaking full sentences   Heart: regular rate    MEDICATIONS: Reviewed  Scheduled Meds:   buPROPion  150 mg Oral Daily    FLUoxetine  80 mg Oral Daily    sodium chloride flush  10 mL IntraVENous 2 times per day    enoxaparin  40 mg SubCUTAneous Daily    methylPREDNISolone  60 mg IntraVENous Q6H    sodium chloride 0.9 % 1,000 mL with folic acid 1 mg, adult multi-vitamin with vitamin k 10 mL, thiamine 100 mg   IntraVENous Daily    levofloxacin  750 mg IntraVENous Q24H    ipratropium 0.5 mg-albuterol 2.5 mg

## 2024-03-24 NOTE — RT PROTOCOL NOTE
RT Inhaler-Nebulizer Bronchodilator Protocol Note    There is a bronchodilator order in the chart from a provider indicating to follow the RT Bronchodilator Protocol and there is an “Initiate RT Inhaler-Nebulizer Bronchodilator Protocol” order as well (see protocol at bottom of note).    CXR Findings:  XR CHEST PORTABLE    Result Date: 3/24/2024  No significant findings in the chest.       The findings from the last RT Protocol Assessment were as follows:   History Pulmonary Disease: None or smoker <15 pack years  Respiratory Pattern: Dyspnea on exertion or RR 21-25 bpm  Breath Sounds: Inspiratory and expiratory or bilateral wheezing and/or rhonchi  Cough: Strong, spontaneous, non-productive  Indication for Bronchodilator Therapy:    Bronchodilator Assessment Score: 8    Aerosolized bronchodilator medication orders have been revised according to the RT Inhaler-Nebulizer Bronchodilator Protocol below.    Respiratory Therapist to perform RT Therapy Protocol Assessment initially then follow the protocol.  Repeat RT Therapy Protocol Assessment PRN for score 0-3 or on second treatment, BID, and PRN for scores above 3.    No Indications - adjust the frequency to every 6 hours PRN wheezing or bronchospasm, if no treatments needed after 48 hours then discontinue using Per Protocol order mode.     If indication present, adjust the RT bronchodilator orders based on the Bronchodilator Assessment Score as indicated below.  Use Inhaler orders unless patient has one or more of the following: on home nebulizer, not able to hold breath for 10 seconds, is not alert and oriented, cannot activate and use MDI correctly, or respiratory rate 25 breaths per minute or more, then use the equivalent nebulizer order(s) with same Frequency and PRN reasons based on the score.  If a patient is on this medication at home then do not decrease Frequency below that used at home.    0-3 - enter or revise RT bronchodilator order(s) to equivalent RT

## 2024-03-24 NOTE — ED NOTES
ED TO INPATIENT SBAR HANDOFF    Patient Name: Catrachita Burleson   :  1995  29 y.o.   MRN:  [id number]  Preferred Name  n/a  ED Room #:  ED-0001/01  Family/Caregiver Present yes   Restraints no   Sitter no   Sepsis Risk Score Sepsis Risk Score: 3    Situation  Code Status: No Order No additional code details.    Allergies: Patient has no known allergies.  Weight: No data found.  Arrived from: home  Chief Complaint:   Chief Complaint   Patient presents with    Shortness of Breath     Pt to ED via [facility] from home after call for SOB. When EMS arrived patient was hunched over and struggling to breath. Epi and deuneb given en route. Upper airway intact. Stridor appreciated during exam. PT report recent URI and prescribed z-roxanne. PT sts she had 4 doses and denies history of allergies to antibiotics. PT denies history of asthma.     Respiratory Distress     Hospital Problem/Diagnosis:  Principal Problem:    Dyspnea  Resolved Problems:    * No resolved hospital problems. *    Imaging:   CT SOFT TISSUE NECK W CONTRAST   Final Result   No apparent acute abnormality of the soft tissue structures of the neck.         XR CHEST PORTABLE   Final Result   No significant findings in the chest.           Abnormal labs:   Abnormal Labs Reviewed   CBC WITH AUTO DIFFERENTIAL - Abnormal; Notable for the following components:       Result Value    WBC 14.2 (*)     Neutrophils Absolute 10.5 (*)     All other components within normal limits   COMPREHENSIVE METABOLIC PANEL W/ REFLEX TO MG FOR LOW K - Abnormal; Notable for the following components:    Sodium 135 (*)     CO2 19 (*)     Glucose 140 (*)     All other components within normal limits   LACTATE, SEPSIS - Abnormal; Notable for the following components:    Lactic Acid, Sepsis 2.6 (*)     All other components within normal limits     Critical values: no     Abnormal Assessment Findings: n/a    Background  History: History reviewed. No pertinent past medical

## 2024-03-24 NOTE — ED NOTES
Patient up to bedside commode. Pt work of breathing became more labored. Pt HR increased to 110's.  PT recovered back to baseline with in 3 min.    Minimal stridor auscultated bilaterally.

## 2024-03-24 NOTE — ED PROVIDER NOTES
I have personally performed a face to face diagnostic evaluation on this patient. I have fully participated in the care of this patient I personally saw the patient and performed a substantive portion of the visit including all aspects of the medical decision making.  I have reviewed and agree with all pertinent clinical information including history, physical exam, diagnostic tests, and the plan.      HPI: Catrachita Burleson presented with severe shortness of breath, stridor.  Patient was found by EMS hunched over struggling to breathe.  Patient has had URI type symptoms for the last few days.  Is taken 4 doses of a Z-Glen.  Patient was given IM epi and route, DuoNebs.  No history of asthma.  No previous history of anaphylaxis or antibiotic allergy.  No other known exposures.  Per EMS patient was tachycardic, tachypneic but saturating well on room air.  Mildly hypertensive.  Chief Complaint   Patient presents with    Shortness of Breath     Pt to ED via [facility] from home after call for SOB. When EMS arrived patient was hunched over and struggling to breath. Epi and deuneb given en route. Upper airway intact. Stridor appreciated during exam. PT report recent URI and prescribed z-glen. PT sts she had 4 doses and denies history of allergies to antibiotics. PT denies history of asthma.     Respiratory Distress      Review of Systems: See YONATAN note  Vital Signs: There were no vitals taken for this visit.    Alert 29 y.o. female who is stridorous, in acute distress, ill-appearing  HENT: Atraumatic, oral mucosa moist  Neck: Grossly normal ROM  Chest/Lungs: Tachypnea, stridor, respiratory distress, sitting forward, accessory muscle use, tachycardia  Abdomen: Soft nontender  Extremities: 2+ radial bilaterally  Musculoskeletal: Grossly normal ROM  Skin: No palor or diaphoresis    Medical Decision Making and Plan:  Pertinent Labs & Imaging studies reviewed. (See YONATAN chart for details)  I agree with YONATAN assessment and 
images are visualized and preliminarily interpreted by the ED Provider with the below findings:        Interpretation per the Radiologist below, if available at the time of this note:    CT SOFT TISSUE NECK W CONTRAST   Final Result   No apparent acute abnormality of the soft tissue structures of the neck.         XR CHEST PORTABLE   Final Result   No significant findings in the chest.           No results found.    No results found.    PROCEDURES   Unless otherwise noted below, none     Procedures    CRITICAL CARE TIME (.cctime)     There was a high probability of life-threatening clinical deterioration in the patient's condition requiring my urgent intervention.  I personally saw the patient and independently provided 32 minutes of non-concurrent critical care out of the total shared critical care time provided, excluding separately reportable procedures.         EMERGENCY DEPARTMENT COURSE and DIFFERENTIAL DIAGNOSIS/MDM:   Vitals:    Vitals:    03/24/24 0948 03/24/24 1000 03/24/24 1030 03/24/24 1045   BP: 106/68 106/85 (!) 126/98 121/88   Pulse: (!) 108 (!) 115 (!) 135 (!) 109   Resp: 16 20 (!) 32 21   SpO2: 97% 97%         Patient was given the following medications:  Medications   racepinephrine HCl (VAPONEFPRIN) 2.25 % nebulizer solution NEBU 0.5 mL (has no administration in time range)   sodium chloride nebulizer 0.9 % solution 3 mL (has no administration in time range)   sodium chloride 0.9 % bolus 1,000 mL (has no administration in time range)   lactated ringers bolus 1,000 mL (0 mLs IntraVENous Stopped 3/24/24 1035)   dexAMETHasone (DECADRON) injection 10 mg (10 mg IntraVENous Given 3/24/24 0911)   ampicillin-sulbactam (UNASYN) 3,000 mg in sodium chloride 0.9 % 100 mL IVPB (mini-bag) (0 mg IntraVENous Stopped 3/24/24 1001)   iopamidol (ISOVUE-370) 76 % injection 75 mL (75 mLs IntraVENous Given 3/24/24 1032)             Is this patient to be included in the SEP-1 Core Measure due to severe sepsis or septic

## 2024-03-25 LAB
ANION GAP SERPL CALCULATED.3IONS-SCNC: 11 MMOL/L (ref 3–16)
BASOPHILS # BLD: 0 K/UL (ref 0–0.2)
BASOPHILS NFR BLD: 0.4 %
BUN SERPL-MCNC: 7 MG/DL (ref 7–20)
CALCIUM SERPL-MCNC: 9.4 MG/DL (ref 8.3–10.6)
CHLORIDE SERPL-SCNC: 105 MMOL/L (ref 99–110)
CO2 SERPL-SCNC: 21 MMOL/L (ref 21–32)
CREAT SERPL-MCNC: 0.6 MG/DL (ref 0.6–1.1)
DEPRECATED RDW RBC AUTO: 13.7 % (ref 12.4–15.4)
EKG ATRIAL RATE: 103 BPM
EKG DIAGNOSIS: NORMAL
EKG P AXIS: 73 DEGREES
EKG P-R INTERVAL: 134 MS
EKG Q-T INTERVAL: 350 MS
EKG QRS DURATION: 72 MS
EKG QTC CALCULATION (BAZETT): 458 MS
EKG R AXIS: 67 DEGREES
EKG T AXIS: 66 DEGREES
EKG VENTRICULAR RATE: 103 BPM
EOSINOPHIL # BLD: 0 K/UL (ref 0–0.6)
EOSINOPHIL NFR BLD: 0.1 %
GFR SERPLBLD CREATININE-BSD FMLA CKD-EPI: >60 ML/MIN/{1.73_M2}
GLUCOSE SERPL-MCNC: 129 MG/DL (ref 70–99)
HCT VFR BLD AUTO: 39 % (ref 36–48)
HGB BLD-MCNC: 13.1 G/DL (ref 12–16)
INR PPP: 1.03 (ref 0.84–1.16)
LACTATE BLDV-SCNC: 2.3 MMOL/L (ref 0.4–2)
LYMPHOCYTES # BLD: 1 K/UL (ref 1–5.1)
LYMPHOCYTES NFR BLD: 9 %
MCH RBC QN AUTO: 30.3 PG (ref 26–34)
MCHC RBC AUTO-ENTMCNC: 33.7 G/DL (ref 31–36)
MCV RBC AUTO: 89.9 FL (ref 80–100)
MONOCYTES # BLD: 0.3 K/UL (ref 0–1.3)
MONOCYTES NFR BLD: 2.4 %
NEUTROPHILS # BLD: 10.3 K/UL (ref 1.7–7.7)
NEUTROPHILS NFR BLD: 88.1 %
PLATELET # BLD AUTO: 329 K/UL (ref 135–450)
PMV BLD AUTO: 7.4 FL (ref 5–10.5)
POTASSIUM SERPL-SCNC: 4.3 MMOL/L (ref 3.5–5.1)
PROCALCITONIN SERPL IA-MCNC: 0.03 NG/ML (ref 0–0.15)
PROTHROMBIN TIME: 13.5 SEC (ref 11.5–14.8)
RBC # BLD AUTO: 4.34 M/UL (ref 4–5.2)
SODIUM SERPL-SCNC: 137 MMOL/L (ref 136–145)
WBC # BLD AUTO: 11.6 K/UL (ref 4–11)

## 2024-03-25 PROCEDURE — 84145 PROCALCITONIN (PCT): CPT

## 2024-03-25 PROCEDURE — 85610 PROTHROMBIN TIME: CPT

## 2024-03-25 PROCEDURE — 85025 COMPLETE CBC W/AUTO DIFF WBC: CPT

## 2024-03-25 PROCEDURE — 83605 ASSAY OF LACTIC ACID: CPT

## 2024-03-25 PROCEDURE — 94640 AIRWAY INHALATION TREATMENT: CPT

## 2024-03-25 PROCEDURE — 6370000000 HC RX 637 (ALT 250 FOR IP): Performed by: INTERNAL MEDICINE

## 2024-03-25 PROCEDURE — 2000000000 HC ICU R&B

## 2024-03-25 PROCEDURE — 2580000003 HC RX 258: Performed by: STUDENT IN AN ORGANIZED HEALTH CARE EDUCATION/TRAINING PROGRAM

## 2024-03-25 PROCEDURE — 93010 ELECTROCARDIOGRAM REPORT: CPT | Performed by: INTERNAL MEDICINE

## 2024-03-25 PROCEDURE — 94761 N-INVAS EAR/PLS OXIMETRY MLT: CPT

## 2024-03-25 PROCEDURE — 2580000003 HC RX 258: Performed by: INTERNAL MEDICINE

## 2024-03-25 PROCEDURE — 80048 BASIC METABOLIC PNL TOTAL CA: CPT

## 2024-03-25 PROCEDURE — 6370000000 HC RX 637 (ALT 250 FOR IP): Performed by: NURSE PRACTITIONER

## 2024-03-25 PROCEDURE — 99233 SBSQ HOSP IP/OBS HIGH 50: CPT | Performed by: INTERNAL MEDICINE

## 2024-03-25 PROCEDURE — 36415 COLL VENOUS BLD VENIPUNCTURE: CPT

## 2024-03-25 PROCEDURE — 2700000000 HC OXYGEN THERAPY PER DAY

## 2024-03-25 PROCEDURE — 6360000002 HC RX W HCPCS: Performed by: INTERNAL MEDICINE

## 2024-03-25 PROCEDURE — 6360000002 HC RX W HCPCS: Performed by: STUDENT IN AN ORGANIZED HEALTH CARE EDUCATION/TRAINING PROGRAM

## 2024-03-25 PROCEDURE — 6360000002 HC RX W HCPCS

## 2024-03-25 RX ORDER — BUDESONIDE 0.5 MG/2ML
0.5 INHALANT ORAL
Status: DISCONTINUED | OUTPATIENT
Start: 2024-03-25 | End: 2024-03-28

## 2024-03-25 RX ORDER — ARFORMOTEROL TARTRATE 15 UG/2ML
SOLUTION RESPIRATORY (INHALATION)
Status: DISPENSED
Start: 2024-03-25 | End: 2024-03-25

## 2024-03-25 RX ORDER — IPRATROPIUM BROMIDE AND ALBUTEROL SULFATE 2.5; .5 MG/3ML; MG/3ML
1 SOLUTION RESPIRATORY (INHALATION)
Status: DISCONTINUED | OUTPATIENT
Start: 2024-03-25 | End: 2024-03-28

## 2024-03-25 RX ORDER — ARFORMOTEROL TARTRATE 15 UG/2ML
SOLUTION RESPIRATORY (INHALATION)
Status: COMPLETED
Start: 2024-03-25 | End: 2024-03-25

## 2024-03-25 RX ORDER — BUDESONIDE 0.5 MG/2ML
INHALANT ORAL
Status: DISPENSED
Start: 2024-03-25 | End: 2024-03-25

## 2024-03-25 RX ORDER — ARFORMOTEROL TARTRATE 15 UG/2ML
15 SOLUTION RESPIRATORY (INHALATION)
Status: DISCONTINUED | OUTPATIENT
Start: 2024-03-25 | End: 2024-03-28

## 2024-03-25 RX ORDER — ENOXAPARIN SODIUM 100 MG/ML
30 INJECTION SUBCUTANEOUS 2 TIMES DAILY
Status: DISCONTINUED | OUTPATIENT
Start: 2024-03-25 | End: 2024-03-28 | Stop reason: HOSPADM

## 2024-03-25 RX ORDER — TRAZODONE HYDROCHLORIDE 50 MG/1
50 TABLET ORAL ONCE
Status: COMPLETED | OUTPATIENT
Start: 2024-03-25 | End: 2024-03-25

## 2024-03-25 RX ORDER — BUDESONIDE 0.5 MG/2ML
INHALANT ORAL
Status: COMPLETED
Start: 2024-03-25 | End: 2024-03-25

## 2024-03-25 RX ADMIN — IPRATROPIUM BROMIDE AND ALBUTEROL SULFATE 1 DOSE: 2.5; .5 SOLUTION RESPIRATORY (INHALATION) at 16:12

## 2024-03-25 RX ADMIN — ALBUTEROL SULFATE 2.5 MG: 2.5 SOLUTION RESPIRATORY (INHALATION) at 00:32

## 2024-03-25 RX ADMIN — WATER 40 MG: 1 INJECTION INTRAMUSCULAR; INTRAVENOUS; SUBCUTANEOUS at 14:33

## 2024-03-25 RX ADMIN — TRAZODONE HYDROCHLORIDE 50 MG: 50 TABLET ORAL at 20:42

## 2024-03-25 RX ADMIN — BUDESONIDE 500 MCG: 0.5 SUSPENSION RESPIRATORY (INHALATION) at 20:47

## 2024-03-25 RX ADMIN — SODIUM CHLORIDE, PRESERVATIVE FREE 10 ML: 5 INJECTION INTRAVENOUS at 21:13

## 2024-03-25 RX ADMIN — ENOXAPARIN SODIUM 30 MG: 100 INJECTION SUBCUTANEOUS at 20:42

## 2024-03-25 RX ADMIN — LEVOFLOXACIN 750 MG: 5 INJECTION, SOLUTION INTRAVENOUS at 14:32

## 2024-03-25 RX ADMIN — IPRATROPIUM BROMIDE AND ALBUTEROL SULFATE 1 DOSE: 2.5; .5 SOLUTION RESPIRATORY (INHALATION) at 12:26

## 2024-03-25 RX ADMIN — BUPROPION HYDROCHLORIDE 150 MG: 150 TABLET, FILM COATED, EXTENDED RELEASE ORAL at 09:14

## 2024-03-25 RX ADMIN — IPRATROPIUM BROMIDE AND ALBUTEROL SULFATE 1 DOSE: 2.5; .5 SOLUTION RESPIRATORY (INHALATION) at 20:44

## 2024-03-25 RX ADMIN — WATER 40 MG: 1 INJECTION INTRAMUSCULAR; INTRAVENOUS; SUBCUTANEOUS at 06:03

## 2024-03-25 RX ADMIN — SODIUM CHLORIDE: 9 INJECTION, SOLUTION INTRAVENOUS at 01:48

## 2024-03-25 RX ADMIN — ARFORMOTEROL TARTRATE 15 MCG: 15 SOLUTION RESPIRATORY (INHALATION) at 12:27

## 2024-03-25 RX ADMIN — ARFORMOTEROL TARTRATE 15 MCG: 15 SOLUTION RESPIRATORY (INHALATION) at 20:47

## 2024-03-25 RX ADMIN — ALBUTEROL SULFATE 2.5 MG: 2.5 SOLUTION RESPIRATORY (INHALATION) at 04:40

## 2024-03-25 RX ADMIN — SODIUM CHLORIDE, PRESERVATIVE FREE 10 ML: 5 INJECTION INTRAVENOUS at 09:15

## 2024-03-25 RX ADMIN — IPRATROPIUM BROMIDE AND ALBUTEROL SULFATE 1 DOSE: 2.5; .5 SOLUTION RESPIRATORY (INHALATION) at 23:31

## 2024-03-25 RX ADMIN — ENOXAPARIN SODIUM 30 MG: 100 INJECTION SUBCUTANEOUS at 09:15

## 2024-03-25 RX ADMIN — BUDESONIDE 500 MCG: 0.5 SUSPENSION RESPIRATORY (INHALATION) at 12:27

## 2024-03-25 RX ADMIN — FLUOXETINE HYDROCHLORIDE 40 MG: 20 CAPSULE ORAL at 09:14

## 2024-03-25 RX ADMIN — WATER 40 MG: 1 INJECTION INTRAMUSCULAR; INTRAVENOUS; SUBCUTANEOUS at 23:05

## 2024-03-25 RX ADMIN — ALBUTEROL SULFATE 2.5 MG: 2.5 SOLUTION RESPIRATORY (INHALATION) at 07:29

## 2024-03-25 NOTE — CARE COORDINATION
Discharge Planning Note:    Chart reviewed and it appears that patient has minimal needs for discharge at this time. Risk Score 6 %     Primary Care Physician is DINESH REYES   Primary insurance is [employer]    Droplet Plus Isolation  Ind PTA, from home    Please notify case management if any discharge needs are identified.      Case management will continue to follow progress and update discharge plan as needed.

## 2024-03-25 NOTE — RT PROTOCOL NOTE
RT Inhaler-Nebulizer Bronchodilator Protocol Note    There is a bronchodilator order in the chart from a provider indicating to follow the RT Bronchodilator Protocol and there is an “Initiate RT Inhaler-Nebulizer Bronchodilator Protocol” order as well (see protocol at bottom of note).    CXR Findings:  XR CHEST PORTABLE    Result Date: 3/24/2024  No significant findings in the chest.       The findings from the last RT Protocol Assessment were as follows:   History Pulmonary Disease: None or smoker <15 pack years  Respiratory Pattern: Dyspnea on exertion or RR 21-25 bpm  Breath Sounds: Inspiratory and expiratory or bilateral wheezing and/or rhonchi  Cough: Strong, productive  Indication for Bronchodilator Therapy:    Bronchodilator Assessment Score: 9    Aerosolized bronchodilator medication orders have been revised according to the RT Inhaler-Nebulizer Bronchodilator Protocol below.    Respiratory Therapist to perform RT Therapy Protocol Assessment initially then follow the protocol.  Repeat RT Therapy Protocol Assessment PRN for score 0-3 or on second treatment, BID, and PRN for scores above 3.    No Indications - adjust the frequency to every 6 hours PRN wheezing or bronchospasm, if no treatments needed after 48 hours then discontinue using Per Protocol order mode.     If indication present, adjust the RT bronchodilator orders based on the Bronchodilator Assessment Score as indicated below.  Use Inhaler orders unless patient has one or more of the following: on home nebulizer, not able to hold breath for 10 seconds, is not alert and oriented, cannot activate and use MDI correctly, or respiratory rate 25 breaths per minute or more, then use the equivalent nebulizer order(s) with same Frequency and PRN reasons based on the score.  If a patient is on this medication at home then do not decrease Frequency below that used at home.    0-3 - enter or revise RT bronchodilator order(s) to equivalent RT Bronchodilator

## 2024-03-26 LAB
ANION GAP SERPL CALCULATED.3IONS-SCNC: 11 MMOL/L (ref 3–16)
BASOPHILS # BLD: 0 K/UL (ref 0–0.2)
BASOPHILS NFR BLD: 0 %
BUN SERPL-MCNC: 11 MG/DL (ref 7–20)
CALCIUM SERPL-MCNC: 9.7 MG/DL (ref 8.3–10.6)
CHLORIDE SERPL-SCNC: 105 MMOL/L (ref 99–110)
CO2 SERPL-SCNC: 23 MMOL/L (ref 21–32)
CREAT SERPL-MCNC: 0.7 MG/DL (ref 0.6–1.1)
DEPRECATED RDW RBC AUTO: 13.8 % (ref 12.4–15.4)
EOSINOPHIL # BLD: 0 K/UL (ref 0–0.6)
EOSINOPHIL NFR BLD: 0 %
GFR SERPLBLD CREATININE-BSD FMLA CKD-EPI: >90 ML/MIN/{1.73_M2}
GLUCOSE SERPL-MCNC: 130 MG/DL (ref 70–99)
HCT VFR BLD AUTO: 37.8 % (ref 36–48)
HGB BLD-MCNC: 12.8 G/DL (ref 12–16)
LACTATE BLDV-SCNC: 1.9 MMOL/L (ref 0.4–2)
LYMPHOCYTES # BLD: 1.2 K/UL (ref 1–5.1)
LYMPHOCYTES NFR BLD: 11 %
MCH RBC QN AUTO: 30.8 PG (ref 26–34)
MCHC RBC AUTO-ENTMCNC: 33.9 G/DL (ref 31–36)
MCV RBC AUTO: 90.7 FL (ref 80–100)
MONOCYTES # BLD: 0.4 K/UL (ref 0–1.3)
MONOCYTES NFR BLD: 3.2 %
NEUTROPHILS # BLD: 9.7 K/UL (ref 1.7–7.7)
NEUTROPHILS NFR BLD: 85.8 %
PLATELET # BLD AUTO: 304 K/UL (ref 135–450)
PMV BLD AUTO: 7.4 FL (ref 5–10.5)
POTASSIUM SERPL-SCNC: 3.9 MMOL/L (ref 3.5–5.1)
RBC # BLD AUTO: 4.17 M/UL (ref 4–5.2)
S PYO THROAT QL CULT: NORMAL
SODIUM SERPL-SCNC: 139 MMOL/L (ref 136–145)
WBC # BLD AUTO: 11.2 K/UL (ref 4–11)

## 2024-03-26 PROCEDURE — 2580000003 HC RX 258: Performed by: STUDENT IN AN ORGANIZED HEALTH CARE EDUCATION/TRAINING PROGRAM

## 2024-03-26 PROCEDURE — 6360000002 HC RX W HCPCS

## 2024-03-26 PROCEDURE — 6370000000 HC RX 637 (ALT 250 FOR IP): Performed by: INTERNAL MEDICINE

## 2024-03-26 PROCEDURE — 85025 COMPLETE CBC W/AUTO DIFF WBC: CPT

## 2024-03-26 PROCEDURE — 2580000003 HC RX 258: Performed by: INTERNAL MEDICINE

## 2024-03-26 PROCEDURE — 6360000002 HC RX W HCPCS: Performed by: INTERNAL MEDICINE

## 2024-03-26 PROCEDURE — 83605 ASSAY OF LACTIC ACID: CPT

## 2024-03-26 PROCEDURE — 94640 AIRWAY INHALATION TREATMENT: CPT

## 2024-03-26 PROCEDURE — 36415 COLL VENOUS BLD VENIPUNCTURE: CPT

## 2024-03-26 PROCEDURE — 80048 BASIC METABOLIC PNL TOTAL CA: CPT

## 2024-03-26 PROCEDURE — 1200000000 HC SEMI PRIVATE

## 2024-03-26 PROCEDURE — 6360000002 HC RX W HCPCS: Performed by: STUDENT IN AN ORGANIZED HEALTH CARE EDUCATION/TRAINING PROGRAM

## 2024-03-26 PROCEDURE — 2700000000 HC OXYGEN THERAPY PER DAY

## 2024-03-26 PROCEDURE — 99233 SBSQ HOSP IP/OBS HIGH 50: CPT | Performed by: INTERNAL MEDICINE

## 2024-03-26 PROCEDURE — 94761 N-INVAS EAR/PLS OXIMETRY MLT: CPT

## 2024-03-26 RX ORDER — BUDESONIDE 0.5 MG/2ML
INHALANT ORAL
Status: COMPLETED
Start: 2024-03-26 | End: 2024-03-26

## 2024-03-26 RX ORDER — ARFORMOTEROL TARTRATE 15 UG/2ML
SOLUTION RESPIRATORY (INHALATION)
Status: DISCONTINUED
Start: 2024-03-26 | End: 2024-03-26

## 2024-03-26 RX ORDER — GUAIFENESIN/DEXTROMETHORPHAN 100-10MG/5
5 SYRUP ORAL EVERY 4 HOURS
Status: DISCONTINUED | OUTPATIENT
Start: 2024-03-26 | End: 2024-03-28 | Stop reason: HOSPADM

## 2024-03-26 RX ORDER — ARFORMOTEROL TARTRATE 15 UG/2ML
SOLUTION RESPIRATORY (INHALATION)
Status: DISPENSED
Start: 2024-03-26 | End: 2024-03-27

## 2024-03-26 RX ORDER — TRAZODONE HYDROCHLORIDE 50 MG/1
50 TABLET ORAL NIGHTLY PRN
Status: DISCONTINUED | OUTPATIENT
Start: 2024-03-26 | End: 2024-03-28 | Stop reason: HOSPADM

## 2024-03-26 RX ORDER — BUDESONIDE 0.5 MG/2ML
INHALANT ORAL
Status: DISCONTINUED
Start: 2024-03-26 | End: 2024-03-26

## 2024-03-26 RX ADMIN — BUDESONIDE 500 MCG: 0.5 SUSPENSION RESPIRATORY (INHALATION) at 20:13

## 2024-03-26 RX ADMIN — LEVOFLOXACIN 750 MG: 5 INJECTION, SOLUTION INTRAVENOUS at 15:21

## 2024-03-26 RX ADMIN — IPRATROPIUM BROMIDE AND ALBUTEROL SULFATE 1 DOSE: 2.5; .5 SOLUTION RESPIRATORY (INHALATION) at 16:25

## 2024-03-26 RX ADMIN — ENOXAPARIN SODIUM 30 MG: 100 INJECTION SUBCUTANEOUS at 08:29

## 2024-03-26 RX ADMIN — IPRATROPIUM BROMIDE AND ALBUTEROL SULFATE 1 DOSE: 2.5; .5 SOLUTION RESPIRATORY (INHALATION) at 12:02

## 2024-03-26 RX ADMIN — WATER 40 MG: 1 INJECTION INTRAMUSCULAR; INTRAVENOUS; SUBCUTANEOUS at 23:07

## 2024-03-26 RX ADMIN — TRAZODONE HYDROCHLORIDE 50 MG: 50 TABLET ORAL at 19:41

## 2024-03-26 RX ADMIN — IPRATROPIUM BROMIDE AND ALBUTEROL SULFATE 1 DOSE: 2.5; .5 SOLUTION RESPIRATORY (INHALATION) at 07:58

## 2024-03-26 RX ADMIN — BUPROPION HYDROCHLORIDE 150 MG: 150 TABLET, FILM COATED, EXTENDED RELEASE ORAL at 08:30

## 2024-03-26 RX ADMIN — FLUOXETINE HYDROCHLORIDE 40 MG: 20 CAPSULE ORAL at 08:30

## 2024-03-26 RX ADMIN — IPRATROPIUM BROMIDE AND ALBUTEROL SULFATE 1 DOSE: 2.5; .5 SOLUTION RESPIRATORY (INHALATION) at 20:13

## 2024-03-26 RX ADMIN — SODIUM CHLORIDE, PRESERVATIVE FREE 10 ML: 5 INJECTION INTRAVENOUS at 19:42

## 2024-03-26 RX ADMIN — WATER 40 MG: 1 INJECTION INTRAMUSCULAR; INTRAVENOUS; SUBCUTANEOUS at 15:09

## 2024-03-26 RX ADMIN — ARFORMOTEROL TARTRATE 15 MCG: 15 SOLUTION RESPIRATORY (INHALATION) at 20:12

## 2024-03-26 RX ADMIN — WATER 40 MG: 1 INJECTION INTRAMUSCULAR; INTRAVENOUS; SUBCUTANEOUS at 06:02

## 2024-03-26 RX ADMIN — SODIUM CHLORIDE: 9 INJECTION, SOLUTION INTRAVENOUS at 15:18

## 2024-03-26 RX ADMIN — ENOXAPARIN SODIUM 30 MG: 100 INJECTION SUBCUTANEOUS at 19:41

## 2024-03-26 RX ADMIN — GUAIFENESIN AND DEXTROMETHORPHAN 5 ML: 100; 10 SYRUP ORAL at 18:24

## 2024-03-26 RX ADMIN — ARFORMOTEROL TARTRATE 15 MCG: 15 SOLUTION RESPIRATORY (INHALATION) at 07:59

## 2024-03-26 RX ADMIN — GUAIFENESIN AND DEXTROMETHORPHAN 5 ML: 100; 10 SYRUP ORAL at 20:57

## 2024-03-26 RX ADMIN — GUAIFENESIN AND DEXTROMETHORPHAN 5 ML: 100; 10 SYRUP ORAL at 13:46

## 2024-03-26 RX ADMIN — SODIUM CHLORIDE, PRESERVATIVE FREE 10 ML: 5 INJECTION INTRAVENOUS at 09:47

## 2024-03-26 RX ADMIN — GUAIFENESIN AND DEXTROMETHORPHAN 5 ML: 100; 10 SYRUP ORAL at 09:47

## 2024-03-26 NOTE — RT PROTOCOL NOTE
RT Nebulizer Bronchodilator Protocol Note    There is a bronchodilator order in the chart from a provider indicating to follow the RT Bronchodilator Protocol and there is an “Initiate RT Bronchodilator Protocol” order as well (see protocol at bottom of note).    CXR Findings:  No results found.    The findings from the last RT Protocol Assessment were as follows:  Smoking: None or smoker <15 pack years  Respiratory Pattern: Use of accessory muscles, prolonged exhalation, or RR 26-30 bpm  Breath Sounds: Inspiratory and expiratory or bilateral wheezing and/or rhonchi  Cough: Strong, spontaneous, non-productive  Indication for Bronchodilator Therapy:    Bronchodilator Assessment Score: 12    Aerosolized bronchodilator medication orders have been revised according to the RT Nebulizer Bronchodilator Protocol below.    Respiratory Therapist to perform RT Therapy Protocol Assessment initially then follow the protocol.  Repeat RT Therapy Protocol Assessment PRN for score 0-3 or on second treatment, BID, and PRN for scores above 3.    No Indications - adjust the frequency to every 6 hours PRN wheezing or bronchospasm, if no treatments needed after 48 hours then discontinue using Per Protocol order mode.     If indication present, adjust the RT bronchodilator orders based on the Bronchodilator Assessment Score as indicated below.  If a patient is on this medication at home then do not decrease Frequency below that used at home.    0-3 - enter or revise RT bronchodilator order(s) to equivalent RT Bronchodilator order with Frequency of every 4 hours PRN for wheezing or increased work of breathing using Per Protocol order mode.       4-6 - enter or revise RT Bronchodilator order(s) to two equivalent RT bronchodilator orders with one order with BID Frequency and one order with Frequency of every 4 hours PRN wheezing or increased work of breathing using Per Protocol order mode.         7-10 - enter or revise RT Bronchodilator

## 2024-03-27 ENCOUNTER — APPOINTMENT (OUTPATIENT)
Dept: GENERAL RADIOLOGY | Age: 29
DRG: 152 | End: 2024-03-27
Payer: COMMERCIAL

## 2024-03-27 LAB
ANION GAP SERPL CALCULATED.3IONS-SCNC: 10 MMOL/L (ref 3–16)
BASOPHILS # BLD: 0 K/UL (ref 0–0.2)
BASOPHILS NFR BLD: 0 %
BUN SERPL-MCNC: 12 MG/DL (ref 7–20)
CALCIUM SERPL-MCNC: 9.5 MG/DL (ref 8.3–10.6)
CHLORIDE SERPL-SCNC: 106 MMOL/L (ref 99–110)
CO2 SERPL-SCNC: 22 MMOL/L (ref 21–32)
CREAT SERPL-MCNC: 0.6 MG/DL (ref 0.6–1.1)
DEPRECATED RDW RBC AUTO: 13.6 % (ref 12.4–15.4)
EOSINOPHIL # BLD: 0.1 K/UL (ref 0–0.6)
EOSINOPHIL NFR BLD: 1 %
GFR SERPLBLD CREATININE-BSD FMLA CKD-EPI: >90 ML/MIN/{1.73_M2}
GLUCOSE SERPL-MCNC: 122 MG/DL (ref 70–99)
HCT VFR BLD AUTO: 38.4 % (ref 36–48)
HGB BLD-MCNC: 13.3 G/DL (ref 12–16)
LYMPHOCYTES # BLD: 1.8 K/UL (ref 1–5.1)
LYMPHOCYTES NFR BLD: 19 %
MCH RBC QN AUTO: 31.6 PG (ref 26–34)
MCHC RBC AUTO-ENTMCNC: 34.7 G/DL (ref 31–36)
MCV RBC AUTO: 91 FL (ref 80–100)
MONOCYTES # BLD: 0.3 K/UL (ref 0–1.3)
MONOCYTES NFR BLD: 3 %
NEUTROPHILS # BLD: 7.2 K/UL (ref 1.7–7.7)
NEUTROPHILS NFR BLD: 77 %
PLATELET # BLD AUTO: 307 K/UL (ref 135–450)
PLATELET BLD QL SMEAR: ADEQUATE
PMV BLD AUTO: 7.2 FL (ref 5–10.5)
POTASSIUM SERPL-SCNC: 4.3 MMOL/L (ref 3.5–5.1)
RBC # BLD AUTO: 4.21 M/UL (ref 4–5.2)
RBC MORPH BLD: NORMAL
SLIDE REVIEW: NORMAL
SODIUM SERPL-SCNC: 138 MMOL/L (ref 136–145)
WBC # BLD AUTO: 9.3 K/UL (ref 4–11)

## 2024-03-27 PROCEDURE — 94761 N-INVAS EAR/PLS OXIMETRY MLT: CPT

## 2024-03-27 PROCEDURE — 6370000000 HC RX 637 (ALT 250 FOR IP): Performed by: INTERNAL MEDICINE

## 2024-03-27 PROCEDURE — 6360000002 HC RX W HCPCS: Performed by: STUDENT IN AN ORGANIZED HEALTH CARE EDUCATION/TRAINING PROGRAM

## 2024-03-27 PROCEDURE — 6360000002 HC RX W HCPCS: Performed by: INTERNAL MEDICINE

## 2024-03-27 PROCEDURE — 94640 AIRWAY INHALATION TREATMENT: CPT

## 2024-03-27 PROCEDURE — 2580000003 HC RX 258: Performed by: INTERNAL MEDICINE

## 2024-03-27 PROCEDURE — 71045 X-RAY EXAM CHEST 1 VIEW: CPT

## 2024-03-27 PROCEDURE — 6360000002 HC RX W HCPCS

## 2024-03-27 PROCEDURE — 36415 COLL VENOUS BLD VENIPUNCTURE: CPT

## 2024-03-27 PROCEDURE — 80048 BASIC METABOLIC PNL TOTAL CA: CPT

## 2024-03-27 PROCEDURE — 99233 SBSQ HOSP IP/OBS HIGH 50: CPT | Performed by: INTERNAL MEDICINE

## 2024-03-27 PROCEDURE — 2580000003 HC RX 258: Performed by: STUDENT IN AN ORGANIZED HEALTH CARE EDUCATION/TRAINING PROGRAM

## 2024-03-27 PROCEDURE — 85025 COMPLETE CBC W/AUTO DIFF WBC: CPT

## 2024-03-27 PROCEDURE — 1200000000 HC SEMI PRIVATE

## 2024-03-27 PROCEDURE — 2700000000 HC OXYGEN THERAPY PER DAY

## 2024-03-27 RX ORDER — BUDESONIDE 0.5 MG/2ML
INHALANT ORAL
Status: DISPENSED
Start: 2024-03-27 | End: 2024-03-27

## 2024-03-27 RX ORDER — BUDESONIDE 0.5 MG/2ML
INHALANT ORAL
Status: DISPENSED
Start: 2024-03-27 | End: 2024-03-28

## 2024-03-27 RX ORDER — ARFORMOTEROL TARTRATE 15 UG/2ML
SOLUTION RESPIRATORY (INHALATION)
Status: DISPENSED
Start: 2024-03-27 | End: 2024-03-28

## 2024-03-27 RX ORDER — ARFORMOTEROL TARTRATE 15 UG/2ML
SOLUTION RESPIRATORY (INHALATION)
Status: DISPENSED
Start: 2024-03-27 | End: 2024-03-27

## 2024-03-27 RX ADMIN — ENOXAPARIN SODIUM 30 MG: 100 INJECTION SUBCUTANEOUS at 22:12

## 2024-03-27 RX ADMIN — IPRATROPIUM BROMIDE AND ALBUTEROL SULFATE 1 DOSE: 2.5; .5 SOLUTION RESPIRATORY (INHALATION) at 19:42

## 2024-03-27 RX ADMIN — ARFORMOTEROL TARTRATE 15 MCG: 15 SOLUTION RESPIRATORY (INHALATION) at 07:43

## 2024-03-27 RX ADMIN — LEVOFLOXACIN 750 MG: 5 INJECTION, SOLUTION INTRAVENOUS at 13:00

## 2024-03-27 RX ADMIN — GUAIFENESIN AND DEXTROMETHORPHAN 5 ML: 100; 10 SYRUP ORAL at 22:12

## 2024-03-27 RX ADMIN — ENOXAPARIN SODIUM 30 MG: 100 INJECTION SUBCUTANEOUS at 08:38

## 2024-03-27 RX ADMIN — BUDESONIDE 500 MCG: 0.5 SUSPENSION RESPIRATORY (INHALATION) at 07:42

## 2024-03-27 RX ADMIN — GUAIFENESIN AND DEXTROMETHORPHAN 5 ML: 100; 10 SYRUP ORAL at 02:13

## 2024-03-27 RX ADMIN — IPRATROPIUM BROMIDE AND ALBUTEROL SULFATE 1 DOSE: 2.5; .5 SOLUTION RESPIRATORY (INHALATION) at 07:40

## 2024-03-27 RX ADMIN — SODIUM CHLORIDE, PRESERVATIVE FREE 10 ML: 5 INJECTION INTRAVENOUS at 22:12

## 2024-03-27 RX ADMIN — GUAIFENESIN AND DEXTROMETHORPHAN 5 ML: 100; 10 SYRUP ORAL at 17:49

## 2024-03-27 RX ADMIN — TRAZODONE HYDROCHLORIDE 50 MG: 50 TABLET ORAL at 22:12

## 2024-03-27 RX ADMIN — BUDESONIDE 500 MCG: 0.5 SUSPENSION RESPIRATORY (INHALATION) at 19:45

## 2024-03-27 RX ADMIN — GUAIFENESIN AND DEXTROMETHORPHAN 5 ML: 100; 10 SYRUP ORAL at 08:38

## 2024-03-27 RX ADMIN — BUPROPION HYDROCHLORIDE 150 MG: 150 TABLET, FILM COATED, EXTENDED RELEASE ORAL at 08:38

## 2024-03-27 RX ADMIN — GUAIFENESIN AND DEXTROMETHORPHAN 5 ML: 100; 10 SYRUP ORAL at 06:13

## 2024-03-27 RX ADMIN — WATER 40 MG: 1 INJECTION INTRAMUSCULAR; INTRAVENOUS; SUBCUTANEOUS at 22:12

## 2024-03-27 RX ADMIN — ARFORMOTEROL TARTRATE 15 MCG: 15 SOLUTION RESPIRATORY (INHALATION) at 19:45

## 2024-03-27 RX ADMIN — SODIUM CHLORIDE, PRESERVATIVE FREE 10 ML: 5 INJECTION INTRAVENOUS at 08:38

## 2024-03-27 RX ADMIN — GUAIFENESIN AND DEXTROMETHORPHAN 5 ML: 100; 10 SYRUP ORAL at 12:51

## 2024-03-27 RX ADMIN — FLUOXETINE HYDROCHLORIDE 40 MG: 20 CAPSULE ORAL at 08:38

## 2024-03-27 RX ADMIN — WATER 40 MG: 1 INJECTION INTRAMUSCULAR; INTRAVENOUS; SUBCUTANEOUS at 06:13

## 2024-03-27 RX ADMIN — WATER 40 MG: 1 INJECTION INTRAMUSCULAR; INTRAVENOUS; SUBCUTANEOUS at 14:55

## 2024-03-27 RX ADMIN — IPRATROPIUM BROMIDE AND ALBUTEROL SULFATE 1 DOSE: 2.5; .5 SOLUTION RESPIRATORY (INHALATION) at 16:28

## 2024-03-27 RX ADMIN — IPRATROPIUM BROMIDE AND ALBUTEROL SULFATE 1 DOSE: 2.5; .5 SOLUTION RESPIRATORY (INHALATION) at 13:00

## 2024-03-27 ASSESSMENT — PAIN DESCRIPTION - DESCRIPTORS
DESCRIPTORS: DISCOMFORT;OTHER (COMMENT)
DESCRIPTORS: DISCOMFORT;OTHER (COMMENT)
DESCRIPTORS: ACHING

## 2024-03-27 ASSESSMENT — PAIN DESCRIPTION - ORIENTATION
ORIENTATION: MID
ORIENTATION: MID

## 2024-03-27 ASSESSMENT — PAIN DESCRIPTION - PAIN TYPE: TYPE: OTHER (COMMENT)

## 2024-03-27 ASSESSMENT — PAIN SCALES - GENERAL
PAINLEVEL_OUTOF10: 4
PAINLEVEL_OUTOF10: 2
PAINLEVEL_OUTOF10: 1

## 2024-03-27 ASSESSMENT — PAIN DESCRIPTION - LOCATION
LOCATION: CHEST

## 2024-03-28 VITALS
SYSTOLIC BLOOD PRESSURE: 151 MMHG | TEMPERATURE: 98 F | BODY MASS INDEX: 34.83 KG/M2 | HEIGHT: 68 IN | WEIGHT: 229.8 LBS | HEART RATE: 112 BPM | DIASTOLIC BLOOD PRESSURE: 84 MMHG | RESPIRATION RATE: 16 BRPM | OXYGEN SATURATION: 93 %

## 2024-03-28 LAB
ANION GAP SERPL CALCULATED.3IONS-SCNC: 11 MMOL/L (ref 3–16)
BACTERIA BLD CULT ORG #2: NORMAL
BACTERIA BLD CULT: NORMAL
BASOPHILS # BLD: 0 K/UL (ref 0–0.2)
BASOPHILS NFR BLD: 0 %
BUN SERPL-MCNC: 12 MG/DL (ref 7–20)
CALCIUM SERPL-MCNC: 9.8 MG/DL (ref 8.3–10.6)
CHLORIDE SERPL-SCNC: 105 MMOL/L (ref 99–110)
CO2 SERPL-SCNC: 25 MMOL/L (ref 21–32)
CREAT SERPL-MCNC: 0.7 MG/DL (ref 0.6–1.1)
DEPRECATED RDW RBC AUTO: 13.7 % (ref 12.4–15.4)
EOSINOPHIL # BLD: 0 K/UL (ref 0–0.6)
EOSINOPHIL NFR BLD: 0 %
GFR SERPLBLD CREATININE-BSD FMLA CKD-EPI: >90 ML/MIN/{1.73_M2}
GLUCOSE SERPL-MCNC: 119 MG/DL (ref 70–99)
HCT VFR BLD AUTO: 40 % (ref 36–48)
HGB BLD-MCNC: 13.2 G/DL (ref 12–16)
LYMPHOCYTES # BLD: 1.8 K/UL (ref 1–5.1)
LYMPHOCYTES NFR BLD: 16 %
MCH RBC QN AUTO: 30 PG (ref 26–34)
MCHC RBC AUTO-ENTMCNC: 33.1 G/DL (ref 31–36)
MCV RBC AUTO: 90.6 FL (ref 80–100)
MONOCYTES # BLD: 0.7 K/UL (ref 0–1.3)
MONOCYTES NFR BLD: 6 %
NEUTROPHILS # BLD: 8.9 K/UL (ref 1.7–7.7)
NEUTROPHILS NFR BLD: 78 %
PLATELET # BLD AUTO: 334 K/UL (ref 135–450)
PLATELET BLD QL SMEAR: ADEQUATE
PMV BLD AUTO: 7.5 FL (ref 5–10.5)
POTASSIUM SERPL-SCNC: 4.2 MMOL/L (ref 3.5–5.1)
RBC # BLD AUTO: 4.42 M/UL (ref 4–5.2)
SLIDE REVIEW: ABNORMAL
SODIUM SERPL-SCNC: 141 MMOL/L (ref 136–145)
WBC # BLD AUTO: 11.4 K/UL (ref 4–11)

## 2024-03-28 PROCEDURE — 6360000002 HC RX W HCPCS: Performed by: INTERNAL MEDICINE

## 2024-03-28 PROCEDURE — 85025 COMPLETE CBC W/AUTO DIFF WBC: CPT

## 2024-03-28 PROCEDURE — 80048 BASIC METABOLIC PNL TOTAL CA: CPT

## 2024-03-28 PROCEDURE — 36415 COLL VENOUS BLD VENIPUNCTURE: CPT

## 2024-03-28 PROCEDURE — 94640 AIRWAY INHALATION TREATMENT: CPT

## 2024-03-28 PROCEDURE — 6370000000 HC RX 637 (ALT 250 FOR IP): Performed by: INTERNAL MEDICINE

## 2024-03-28 PROCEDURE — 94761 N-INVAS EAR/PLS OXIMETRY MLT: CPT

## 2024-03-28 PROCEDURE — 2580000003 HC RX 258: Performed by: STUDENT IN AN ORGANIZED HEALTH CARE EDUCATION/TRAINING PROGRAM

## 2024-03-28 PROCEDURE — 99232 SBSQ HOSP IP/OBS MODERATE 35: CPT | Performed by: STUDENT IN AN ORGANIZED HEALTH CARE EDUCATION/TRAINING PROGRAM

## 2024-03-28 PROCEDURE — 6370000000 HC RX 637 (ALT 250 FOR IP): Performed by: STUDENT IN AN ORGANIZED HEALTH CARE EDUCATION/TRAINING PROGRAM

## 2024-03-28 PROCEDURE — 6360000002 HC RX W HCPCS: Performed by: STUDENT IN AN ORGANIZED HEALTH CARE EDUCATION/TRAINING PROGRAM

## 2024-03-28 PROCEDURE — 2580000003 HC RX 258: Performed by: INTERNAL MEDICINE

## 2024-03-28 RX ORDER — ALBUTEROL SULFATE 90 UG/1
2 AEROSOL, METERED RESPIRATORY (INHALATION) EVERY 4 HOURS PRN
Qty: 18 G | Refills: 0 | Status: SHIPPED | OUTPATIENT
Start: 2024-03-28

## 2024-03-28 RX ORDER — GUAIFENESIN/DEXTROMETHORPHAN 100-10MG/5
5 SYRUP ORAL 4 TIMES DAILY PRN
Qty: 100 ML | Refills: 0 | Status: SHIPPED | OUTPATIENT
Start: 2024-03-28 | End: 2024-04-02

## 2024-03-28 RX ORDER — BUDESONIDE 0.5 MG/2ML
INHALANT ORAL
Status: DISCONTINUED
Start: 2024-03-28 | End: 2024-03-28 | Stop reason: HOSPADM

## 2024-03-28 RX ORDER — PREDNISONE 10 MG/1
TABLET ORAL
Qty: 25 TABLET | Refills: 0 | Status: SHIPPED | OUTPATIENT
Start: 2024-03-29 | End: 2024-04-06

## 2024-03-28 RX ADMIN — WATER 40 MG: 1 INJECTION INTRAMUSCULAR; INTRAVENOUS; SUBCUTANEOUS at 06:47

## 2024-03-28 RX ADMIN — ENOXAPARIN SODIUM 30 MG: 100 INJECTION SUBCUTANEOUS at 09:18

## 2024-03-28 RX ADMIN — BUDESONIDE 500 MCG: 0.5 SUSPENSION RESPIRATORY (INHALATION) at 08:10

## 2024-03-28 RX ADMIN — GUAIFENESIN AND DEXTROMETHORPHAN 5 ML: 100; 10 SYRUP ORAL at 01:59

## 2024-03-28 RX ADMIN — IPRATROPIUM BROMIDE AND ALBUTEROL SULFATE 1 DOSE: 2.5; .5 SOLUTION RESPIRATORY (INHALATION) at 08:09

## 2024-03-28 RX ADMIN — SODIUM CHLORIDE, PRESERVATIVE FREE 10 ML: 5 INJECTION INTRAVENOUS at 09:19

## 2024-03-28 RX ADMIN — GUAIFENESIN AND DEXTROMETHORPHAN 5 ML: 100; 10 SYRUP ORAL at 06:47

## 2024-03-28 RX ADMIN — FLUOXETINE HYDROCHLORIDE 40 MG: 20 CAPSULE ORAL at 09:18

## 2024-03-28 RX ADMIN — GUAIFENESIN AND DEXTROMETHORPHAN 5 ML: 100; 10 SYRUP ORAL at 09:18

## 2024-03-28 RX ADMIN — BUPROPION HYDROCHLORIDE 150 MG: 150 TABLET, FILM COATED, EXTENDED RELEASE ORAL at 09:18

## 2024-03-28 RX ADMIN — ARFORMOTEROL TARTRATE 15 MCG: 15 SOLUTION RESPIRATORY (INHALATION) at 08:09

## 2024-03-28 ASSESSMENT — PAIN DESCRIPTION - DESCRIPTORS: DESCRIPTORS: ACHING

## 2024-03-28 ASSESSMENT — PAIN DESCRIPTION - ORIENTATION: ORIENTATION: MID

## 2024-03-28 ASSESSMENT — PAIN DESCRIPTION - LOCATION: LOCATION: ABDOMEN

## 2024-03-28 ASSESSMENT — PAIN SCALES - GENERAL: PAINLEVEL_OUTOF10: 3

## 2024-03-28 ASSESSMENT — PAIN - FUNCTIONAL ASSESSMENT: PAIN_FUNCTIONAL_ASSESSMENT: ACTIVITIES ARE NOT PREVENTED

## 2024-03-28 ASSESSMENT — PAIN DESCRIPTION - PAIN TYPE: TYPE: ACUTE PAIN

## 2024-03-28 NOTE — PLAN OF CARE
Problem: Discharge Planning  Goal: Discharge to home or other facility with appropriate resources  3/28/2024 1157 by Bobo Sommer RN  Outcome: Adequate for Discharge  3/28/2024 1009 by Bobo Sommer RN  Outcome: Progressing     Problem: Pain  Goal: Verbalizes/displays adequate comfort level or baseline comfort level  3/28/2024 1157 by Bobo Sommer RN  Outcome: Adequate for Discharge  3/28/2024 1009 by Bobo Sommer RN  Outcome: Progressing

## 2024-03-28 NOTE — PROGRESS NOTES
HOSPITALISTS PROGRESS NOTE    3/25/2024 10:35 AM        Name: Catrachita Burleson .              Admitted: 3/24/2024  Primary Care Provider: Satnam Aburto MD (Tel: None)      Brief Course: This 29-year-old female with PMHx of marijuana abuse, anxiety and depression who presented with shortness of breath.  Per patient's report, patient was recently seen at urgent care for nasal congestion, sinus pressure and hoarseness and was treated with antibiotic course.  On admission, patient has bilateral expiratory wheezing and stridor; received racemic epinephrine with some improvement.      Interval history:   Pt seen and examined today   Overnight events noted and interval ancillary notes reviewed.  On 2 L nasal cannula satting around 97%; wean as tolerated to keep sats above 92%  Afebrile overnight, WBCs down to 11.1 K.  Blood cultures in process respiratory molecular panel negative  Pt resting in bed; reported that SOB and hoarseness improved.  Endorsed congested cough with deep breaths  No further stridor noticed.  Continues to have expiratory wheezing      Assessment & Plan:     Acute hypoxic respiratory failure; improving  COVID-19, flu, strep screen neg.  Respiratory molecular panel negative  Pulmonology on board; continue IV Solu-Medrol 40 mg IV every 8 hours, Pulmicort & Brovana nebs and DuoNebs every 4 hours.  Continue supplemental O2; wean as tolerated keep sats above 92%    Acute bronchitis/laryngitis laryngitis   CT soft tissue neck negative for any acute abnormality.  X-ray chest negative for acute pathology  Initially required heliox    Lactic acidosis; likely 2/2 hypoxia/dehydration.  S/p IV fluids.  Trend lactate    Anxiety and depression; continue home meds.      DVT PPX: Lovenox  Code:Full Code    Disposition: Once acute medical issues have resolved    Current Medications  enoxaparin Sodium (LOVENOX) injection 30 mg, BID  budesonide 
                                                                      HOSPITALISTS PROGRESS NOTE    3/26/2024 8:23 AM        Name: Catrachita Burleson .              Admitted: 3/24/2024  Primary Care Provider: Satnam Aburto MD (Tel: None)      Brief Course: This 29-year-old female with PMHx of marijuana abuse, anxiety and depression who presented with shortness of breath.  Per patient's report, patient was recently seen at urgent care for nasal congestion, sinus pressure and hoarseness and was treated with antibiotic course.  On admission, patient has bilateral expiratory wheezing and stridor; received racemic epinephrine with some improvement.      Interval history:   Pt seen and examined today.  Overnight events noted, interval ancillary notes and labs reviewed.   On 2 L nasal cannula satting around 91%.  Afebrile overnight, WBCs down to 11.2 K, blood cultures NGTD  Reported her breathing has improved but continues to have cough and wheezing   denied any fever, chills, chest pain, nausea, vomiting or abdominal pain        Assessment & Plan:     Acute hypoxic respiratory failure; improving  COVID-19, flu, strep screen neg.  Respiratory molecular panel negative  Pulmonology on board; continue IV Solu-Medrol 40 mg IV every 8 hours, Pulmicort & Brovana nebs and DuoNebs every 4 hours.  Continue antitussive.  Supplemental O2; wean as tolerated keep sats above 92%    Acute bronchitis/laryngitis laryngitis   CT soft tissue neck negative for any acute abnormality.  X-ray chest negative for acute pathology  Initially required heliox    Lactic acidosis; likely 2/2 hypoxia/dehydration.  Resolved.  IV fluid discontinued    Anxiety and depression; continue home meds.      DVT PPX: Lovenox  Code:Full Code    Disposition: Once acute medical issues have resolved    Current Medications  enoxaparin Sodium (LOVENOX) injection 30 mg, BID  budesonide (PULMICORT) nebulizer suspension 500 mcg, BID RT  arformoterol tartrate (BROVANA) 
                                                                      HOSPITALISTS PROGRESS NOTE    3/27/2024 9:16 AM        Name: Catrachita Burleson .              Admitted: 3/24/2024  Primary Care Provider: Satnam Aburto MD (Tel: None)      Brief Course: This 29-year-old female with PMHx of marijuana abuse, anxiety and depression who presented with shortness of breath.        Interval history:   Pt seen and examined today.  Overnight events noted, interval ancillary notes and labs reviewed.   O2 requirement improving; on 1 L NC satting between 93% 95%.  Afebrile overnight, WBCs WNL, cultures NGTD  Reported that her breathing has improved but still has nonproductive cough.  Wheezing improved      Assessment & Plan:     Acute hypoxic respiratory failure; improving  COVID-19, flu, strep screen neg.  Respiratory molecular panel negative  Pulmonology on board; continue IV Solu-Medrol 40 mg IV every 8 hours, Pulmicort & Brovana nebs and DuoNebs every 4 hours.  Continue antitussive.  Supplemental O2; wean as tolerated keep sats above 92%    Acute bronchitis/laryngitis laryngitis   CT soft tissue neck negative for any acute abnormality.  X-ray chest negative for acute pathology  Initially required heliox    Lactic acidosis; likely 2/2 hypoxia/dehydration.  Resolved.  IV fluid discontinued    Anxiety and depression; continue home meds.      DVT PPX: Lovenox  Code:Full Code    Disposition: Once acute medical issues have resolved    Current Medications  arformoterol tartrate (BROVANA) 15 MCG/2ML nebulizer solution,   budesonide (PULMICORT) 0.5 MG/2ML nebulizer suspension,   guaiFENesin-dextromethorphan (ROBITUSSIN DM) 100-10 MG/5ML syrup 5 mL, Q4H  traZODone (DESYREL) tablet 50 mg, Nightly PRN  enoxaparin Sodium (LOVENOX) injection 30 mg, BID  budesonide (PULMICORT) nebulizer suspension 500 mcg, BID RT  arformoterol tartrate (BROVANA) nebulizer solution 15 mcg, BID RT  ipratropium 0.5 mg-albuterol 2.5 mg (DUONEB) nebulizer 
    PULMONARY AND CRITICAL CARE MEDICINE PROGRESS NOTE      SUBJECTIVE: Patient continues to have slow improvement.  Improved shortness of breath but still has cough.  Improved wheezing.    REVIEW OF SYSTEMS:   CONSTITUTIONAL SYMPTOMS: The patient denies fever, fatigue, night sweats, weight loss or weight gain.   HEENT: No vision changes. No tinnitus, Denies sinus pain. No hoarseness, or dysphagia.   CARDIOVASCULAR: Denies chest pain, palpitation, syncope.  RESPIRATORY: See above.   GASTROINTESTINAL: Denies nausea, abdominal pain or change in bowel function.  SKIN: No rashes or itching.   MUSCULOSKELETAL: Denies weakness or bone pain.   NEUROLOGICAL: No headaches or seizures.     MEDICATIONS:      arformoterol tartrate        budesonide        guaiFENesin-dextromethorphan  5 mL Oral Q4H    enoxaparin  30 mg SubCUTAneous BID    budesonide  0.5 mg Nebulization BID RT    arformoterol tartrate  15 mcg Nebulization BID RT    ipratropium 0.5 mg-albuterol 2.5 mg  1 Dose Inhalation Q4H WA RT    buPROPion  150 mg Oral Daily    FLUoxetine  40 mg Oral Daily    sodium chloride flush  10 mL IntraVENous 2 times per day    levofloxacin  750 mg IntraVENous Q24H    methylPREDNISolone  40 mg IntraVENous Q8H      sodium chloride 5 mL/hr at 03/26/24 1518     arformoterol tartrate, budesonide, traZODone, racepinephrine HCl, sodium chloride nebulizer, sodium chloride flush, sodium chloride, potassium chloride **OR** potassium alternative oral replacement **OR** potassium chloride, magnesium sulfate, ondansetron **OR** ondansetron, polyethylene glycol, acetaminophen **OR** acetaminophen     ALLERGIES:   Allergies as of 03/24/2024    (No Known Allergies)        OBJECTIVE:   height is 1.727 m (5' 7.99\") and weight is 108.8 kg (239 lb 13.8 oz). Her oral temperature is 97.6 °F (36.4 °C). Her blood pressure is 125/85 and her pulse is 105 (abnormal). Her respiration is 18 and oxygen saturation is 96%.   No intake/output data recorded. 
    PULMONARY AND CRITICAL CARE MEDICINE PROGRESS NOTE    Subjective: Patient is sitting comfortably in the bed.  Still wheezing but has improved compared to yesterday.  No stridor noted.  On 2 L nasal cannula and saturating well.  Improved shortness of breath and cough.      REVIEW OF SYSTEMS:   Constitutional symptoms: The patient denies fever, fatigue, night sweats, weight loss or weight gain.   HEENT: No vision changes. No tinnitus, Denies sinus pain. No hoarseness, or dysphagia.   Neck: Patient denies swelling in the neck.   Cardiovascular: Denies chest pain, palpitation, syncope.  Respiratory: See above.   Gastrointestinal: Denies nausea, abdominal pain or change in bowel function.  Genitourinary: Denies obstructive symptoms. No history of incontinence.  Skin: No rashes or itching.   Muskuloskeletal: Denies weakness or bone pain.   Neurological: No headaches or seizures.   Psychiatric: Denies mood swings or depression.     MEDICATIONS:     Scheduled Meds:   enoxaparin  30 mg SubCUTAneous BID    budesonide  0.5 mg Nebulization BID RT    arformoterol tartrate  15 mcg Nebulization BID RT    ipratropium 0.5 mg-albuterol 2.5 mg  1 Dose Inhalation Q4H WA RT    budesonide        arformoterol tartrate        buPROPion  150 mg Oral Daily    FLUoxetine  40 mg Oral Daily    sodium chloride flush  10 mL IntraVENous 2 times per day    levofloxacin  750 mg IntraVENous Q24H    methylPREDNISolone  40 mg IntraVENous Q8H       Current Infusions:    sodium chloride         PRN meds:  budesonide, arformoterol tartrate, racepinephrine HCl, sodium chloride nebulizer, sodium chloride flush, sodium chloride, potassium chloride **OR** potassium alternative oral replacement **OR** potassium chloride, magnesium sulfate, ondansetron **OR** ondansetron, polyethylene glycol, acetaminophen **OR** acetaminophen    PHYSICAL EXAM:  BP (!) 152/102   Pulse (!) 105   Temp 98.1 °F (36.7 °C) (Temporal)   Resp 18   Ht 1.727 m (5' 7.99\")   Wt 
   03/24/24 1331   RT Protocol   History Pulmonary Disease 0   Respiratory pattern 2   Breath sounds 6   Cough 0   Bronchodilator Assessment Score 8       
  Physician Progress Note      PATIENT:               PAN TERRY  CSN #:                  [id number]  :                       1995  ADMIT DATE:       3/24/2024 9:04 AM  DISCH DATE:  RESPONDING  PROVIDER #:        Beatrice Davis MD          QUERY TEXT:    Pt admitted with Dyspnea secondary to severe bronchoconstriction probably from   underlying URI viral versus bacterial.  Noted documentation of sepsis in ED.    If possible, please document in progress notes and/or discharge summary   whether:    The medical record reflects the following:  Risk Factors: Failed outpatient treatment at home with [facility]  Clinical Indicators: , WBC 14.2, LA 2.6, Per ED: \"Sepsis score 3, May   have criteria for sepsis, but does not meet criteria for severe sepsis or   septic shock.\"  Treatment: IV Bolus fluids, IV Levaquin, cultures  Options provided:  -- Sepsis present on admission due to viral illness  -- Sepsis present on admission due to Acute bronchitis and laryngitis  -- Sepsis ruled out  -- Other - I will add my own diagnosis  -- Disagree - Not applicable / Not valid  -- Disagree - Clinically unable to determine / Unknown  -- Refer to Clinical Documentation Reviewer    PROVIDER RESPONSE TEXT:    The diagnosis of Sepsis was ruled out.    Query created by: Kelley Martin on 3/26/2024 10:57 AM      Electronically signed by:  Beatrice Davis MD 3/26/2024 2:15 PM          
  Pulmonary and Critical Care Medicine    CC: SOB   Date: 3/28/2024  Admit Date:  3/24/2024  Reason for Consultation: severe bronchoconstriction   Consult Requesting Physician: Beatrice Davis MD     HPI     This is a 30 y/o F w/ a hx of marijuana smoke who presented with SOB, strider and wheezing.     Overnight:  She is back to baseline today  No wheezing   Off oxygen   Stridor resolved     ROS: negative except stated above    OBJECTIVE DATA       PHYSICAL EXAM:   Temp  Av °F (36.7 °C)  Min: 97.6 °F (36.4 °C)  Max: 98.5 °F (36.9 °C)  Pulse  Av.5  Min: 87  Max: 113  BP  Min: 118/86  Max: 134/84  SpO2  Av %  Min: 92 %  Max: 97 %    General: NAD  Lung: CTA  Heart: regular rate    MEDICATIONS: Reviewed  Scheduled Meds:   budesonide        guaiFENesin-dextromethorphan  5 mL Oral Q4H    enoxaparin  30 mg SubCUTAneous BID    budesonide  0.5 mg Nebulization BID RT    arformoterol tartrate  15 mcg Nebulization BID RT    ipratropium 0.5 mg-albuterol 2.5 mg  1 Dose Inhalation Q4H WA RT    buPROPion  150 mg Oral Daily    FLUoxetine  40 mg Oral Daily    sodium chloride flush  10 mL IntraVENous 2 times per day    methylPREDNISolone  40 mg IntraVENous Q8H     Continuous Infusions:   sodium chloride 5 mL/hr at 24 1518     PRN Meds:.budesonide, traZODone, racepinephrine HCl, sodium chloride nebulizer, sodium chloride flush, sodium chloride, potassium chloride **OR** potassium alternative oral replacement **OR** potassium chloride, magnesium sulfate, ondansetron **OR** ondansetron, polyethylene glycol, acetaminophen **OR** acetaminophen     LABS: Reviewed.   Recent Labs     24  0435 24  0421 24  0537    138 141   K 3.9 4.3 4.2    106 105   CO2 23 22 25   BUN 11 12 12   CREATININE 0.7 0.6 0.7     Recent Labs     24  0435 24  0421 24  0537   WBC 11.2* 9.3 11.4*   HGB 12.8 13.3 13.2   HCT 37.8 38.4 40.0   MCV 90.7 91.0 90.6    307 334     Lab Results 
Assessment complete. VSS. Patient resting in bed. Respirations even and easy. Call light in reach. Fall precautions in place. No needs expressed at this time.   
Heliox stopped at this time  
Patient arrived by stretcher from emergency dept and transferred to bed with minimal assist.She is dyspneic with exertion and with talking but sats remain WNL and patient recovers quickly.POC explained.Notified Pulmonologist of patient arrival.Call light within reach.See admission flowsheets.  
Patient is coughing more this morning, still nonproductive. Gave cough syrup per MAR. Weaned O2 off and able to walk to bathroom without any SOB.   
Patient walked twice around [place] without any discomfort or SOB.   
Placed patient on cool mist aerosol with a face mask strictly for humidity for croupy sounding cough that patient has had for a few days now.  
Pt started on 80/20 heliox 2lpm via nonrebreather.  Pt receiving O2 2lpm via nasal cannula.  
Pt transfer to  VSS. Handoff complete from ICU RN OG.   
RN discharge summary from [address] [place] to home.     This patient has had a discharge order placed. They are returning home and being picked up in the lobby. Discharge paperwork has been printed, highlighted, and gone over with the patient by this RN. Patient understands teaching and has no further questions at this time. IV has been removed with no complications. Telemetry has been removed. Pt has all belongings present.      Bobo Sommer RN, BSN    
Shift assessment completed. Routine vitals stable. Scheduled medications given. Patient is awake, alert and oriented x4. Respirations are easy and unlabored. Patient does not appear to be in distress, resting comfortably at this time. Call light within reach. Denies needs at this time. Pain 3/10 but \"tolerable\" per pt, declines PRN pain medication at this time.    Bobo Sommer RN, BSN     
TRANSFER - OUT REPORT:    Verbal report given to Dulce on Catrachita Burleson  being transferred to [facility] for routine progression of patient care       Report consisted of patient's Situation, Background, Assessment and   Recommendations(SBAR).     Information from the following report(s) Nurse Handoff Report, MAR, Cardiac Rhythm SR- Patient currently sinus tachy due to anxiety with trasnfer, and Neuro Assessment was reviewed with the receiving nurse.           Lines:   Peripheral IV 03/24/24 Left Wrist (Active)   Site Assessment Clean, dry & intact 03/27/24 1019   Line Status Normal saline locked;Capped 03/27/24 1019   Phlebitis Assessment No symptoms 03/27/24 1019   Infiltration Assessment 0 03/27/24 1019   Alcohol Cap Used Yes 03/27/24 1019   Dressing Status Clean, dry & intact 03/27/24 1019   Dressing Type Transparent 03/27/24 1019        Opportunity for questions and clarification was provided.      Patient transported with:  O2 @ 1lpm    Patient is on 02 at 1L for intermittent desaturation  Dyspnea with exertion  Tolerating Reg diet            
admission.  Mild improvement but still wheezing diffusely and has cough.  No stridor noted.  Continue Solu-Medrol 40 IV every 8.  Continue DuoNebs every 4 hours.  Continue Pulmicort Brovana nebs twice daily  Robitussin-guaifenesin cough syrup every 4 hours  Respiratory viral panel with COVID-negative.        Itzel Goel MD  Pulmonary Critical Care and Sleep Medicine  3/26/2024, 10:57 AM    This note was completed using dragon medical speech recognition software. Grammatical errors, random word insertions, pronoun errors and incomplete sentences are occasional consequences of this technology due to software limitations. If there are questions or concerns about the content of this note of information contained within the body of this dictation they should be addressed with the provider for clarification.

## 2024-03-28 NOTE — DISCHARGE INSTRUCTIONS
Follow up with your PCP within 7-10 days of discharge.  Follow up with pulmonology  as instructed   Take all your medications as prescribed.

## 2024-03-28 NOTE — PLAN OF CARE
3/28/2024    To whom it may concern:    Catrachita Burleson  was hospitalized from 3/24/2024 to 3/28/2024.  Pt is cleared to return to work on Monday April 2, 2024    If you have any questions or concerns, please do not hesitate to contact me at [phone].    Sincerely,          Electronically signed by ABBE BURNS MD on 3/28/2024 at 10:59 AM

## 2024-04-04 ENCOUNTER — HOSPITAL ENCOUNTER (EMERGENCY)
Age: 29
Discharge: HOME | End: 2024-04-04

## 2024-04-04 ENCOUNTER — OFFICE VISIT (OUTPATIENT)
Dept: PULMONOLOGY | Age: 29
End: 2024-04-04
Payer: COMMERCIAL

## 2024-04-04 VITALS
BODY MASS INDEX: 36.44 KG/M2 | DIASTOLIC BLOOD PRESSURE: 76 MMHG | HEIGHT: 67 IN | SYSTOLIC BLOOD PRESSURE: 118 MMHG | HEART RATE: 81 BPM | WEIGHT: 232.2 LBS | OXYGEN SATURATION: 97 %

## 2024-04-04 DIAGNOSIS — J30.2 SEASONAL ALLERGIES: ICD-10-CM

## 2024-04-04 DIAGNOSIS — R06.2 WHEEZING: Primary | ICD-10-CM

## 2024-04-04 DIAGNOSIS — R05.3 CHRONIC COUGH: ICD-10-CM

## 2024-04-04 PROCEDURE — G8417 CALC BMI ABV UP PARAM F/U: HCPCS | Performed by: STUDENT IN AN ORGANIZED HEALTH CARE EDUCATION/TRAINING PROGRAM

## 2024-04-04 PROCEDURE — 1111F DSCHRG MED/CURRENT MED MERGE: CPT | Performed by: STUDENT IN AN ORGANIZED HEALTH CARE EDUCATION/TRAINING PROGRAM

## 2024-04-04 PROCEDURE — 1036F TOBACCO NON-USER: CPT | Performed by: STUDENT IN AN ORGANIZED HEALTH CARE EDUCATION/TRAINING PROGRAM

## 2024-04-04 PROCEDURE — 99214 OFFICE O/P EST MOD 30 MIN: CPT | Performed by: STUDENT IN AN ORGANIZED HEALTH CARE EDUCATION/TRAINING PROGRAM

## 2024-04-04 PROCEDURE — G8427 DOCREV CUR MEDS BY ELIG CLIN: HCPCS | Performed by: STUDENT IN AN ORGANIZED HEALTH CARE EDUCATION/TRAINING PROGRAM

## 2024-04-04 NOTE — PROGRESS NOTES
HOURS PRN    buPROPion (WELLBUTRIN SR) 150 mg, Oral, DAILY    FLUoxetine (PROZAC) 80 mg, Oral, DAILY    mometasone-formoterol (DULERA) 200-5 MCG/ACT inhaler 2 puffs, Inhalation, 2 TIMES DAILY RESP    predniSONE (DELTASONE) 10 MG tablet Take 4 tablets by mouth daily for 3 days, THEN 3 tablets daily for 3 days, THEN 2 tablets daily for 2 days.    traZODone (DESYREL) 50 mg, Oral, NIGHTLY        LABS:    BMP:   Lab Results   Component Value Date/Time     03/28/2024 05:37 AM    K 4.2 03/28/2024 05:37 AM     03/28/2024 05:37 AM    CO2 25 03/28/2024 05:37 AM    BUN 12 03/28/2024 05:37 AM    CREATININE 0.7 03/28/2024 05:37 AM    GLUCOSE 119 03/28/2024 05:37 AM    CALCIUM 9.8 03/28/2024 05:37 AM     CBC:   Lab Results   Component Value Date/Time    WBC 11.4 03/28/2024 05:37 AM    RBC 4.42 03/28/2024 05:37 AM    HGB 13.2 03/28/2024 05:37 AM    HCT 40.0 03/28/2024 05:37 AM    MCV 90.6 03/28/2024 05:37 AM    MCH 30.0 03/28/2024 05:37 AM    MCHC 33.1 03/28/2024 05:37 AM    RDW 13.7 03/28/2024 05:37 AM     03/28/2024 05:37 AM    MPV 7.5 03/28/2024 05:37 AM    EOSABS 0.0 03/28/2024 05:37 AM    EOSABS 0.1 03/27/2024 04:21 AM    EOSABS 0.0 03/26/2024 04:35 AM       IMAGES:    CT Chest 3/24/2024  IMPRESSION:  No apparent acute abnormality of the soft tissue structures of the neck.       PFT: Ordered    6MWT: None    ASSESSMENT AND PLAN:     Wheezing / cough - she was admitted with sever bronchospasm March 2024 needed nebs, steroids, on today's questioning it appears patient has been having intermittent symptoms prior to her ER presentation, which makes it concerning for new onset asthma. She was discharged on Dulera 200 2P BID with I will continue, advised rinse mouth after, she has prn albuterol which she should keep with self at all times. I will get PFT with bronch response. Smoking cessation as below.     Marijuana smoke - has smoked for 9 yrs, she has quit smoking since hospitalization finds that it has helped

## 2024-04-04 NOTE — PROGRESS NOTES
Rapid Response Quick Summary    Room: Lobby near the [employer] shop    Assessment of concern / patient:  Syncopal episode     Physician involved:  Dr Rashawn Dan    Interventions:  Monitoring of vitals     Disposition:  Transport per wheelchair to ER

## 2024-04-10 ENCOUNTER — HOSPITAL ENCOUNTER (OUTPATIENT)
Dept: PULMONOLOGY | Age: 29
Discharge: HOME | End: 2024-04-10
Attending: STUDENT IN AN ORGANIZED HEALTH CARE EDUCATION/TRAINING PROGRAM
Payer: COMMERCIAL

## 2024-04-10 DIAGNOSIS — R05.3 CHRONIC COUGH: ICD-10-CM

## 2024-04-10 DIAGNOSIS — R06.2 WHEEZING: ICD-10-CM

## 2024-04-10 LAB
DLCO %PRED: 102 %
DLCO PRED: NORMAL
DLCO/VA %PRED: NORMAL
DLCO/VA PRED: NORMAL
DLCO/VA: NORMAL
DLCO: NORMAL
EXPIRATORY TIME-POST: NORMAL
EXPIRATORY TIME: NORMAL
FEF 25-75 %CHNG: NORMAL
FEF 25-75 POST %PRED: NORMAL
FEF 25-75% %PRED-PRE: NORMAL
FEF 25-75% PRED: NORMAL
FEF 25-75-POST: NORMAL
FEF 25-75-PRE: NORMAL
FEV1 %PRED-POST: NORMAL
FEV1 %PRED-PRE: 86 %
FEV1 PRED: NORMAL
FEV1-POST: NORMAL
FEV1-PRE: NORMAL
FEV1/FVC %PRED-POST: NORMAL
FEV1/FVC %PRED-PRE: NORMAL
FEV1/FVC PRED: NORMAL
FEV1/FVC-POST: NORMAL
FEV1/FVC-PRE: 72 %
FVC %PRED-POST: NORMAL
FVC %PRED-PRE: NORMAL
FVC PRED: NORMAL
FVC-POST: NORMAL
FVC-PRE: NORMAL
GAW %PRED: NORMAL
GAW PRED: NORMAL
GAW: NORMAL
IC PRE %PRED: NORMAL
IC PRED: NORMAL
IC: NORMAL
MEP: NORMAL
MIP: NORMAL
MVV %PRED-PRE: NORMAL
MVV PRED: NORMAL
MVV-PRE: NORMAL
PEF %PRED-POST: NORMAL
PEF %PRED-PRE: NORMAL
PEF PRED: NORMAL
PEF%CHNG: NORMAL
PEF-POST: NORMAL
PEF-PRE: NORMAL
RAW %PRED: NORMAL
RAW PRED: NORMAL
RAW: NORMAL
RV PRE %PRED: NORMAL
RV PRED: NORMAL
RV: NORMAL
SVC %PRED: NORMAL
SVC PRED: NORMAL
SVC: NORMAL
TLC PRE %PRED: 142 %
TLC PRED: NORMAL
TLC: NORMAL
VA %PRED: NORMAL
VA PRED: NORMAL
VA: NORMAL
VTG %PRED: NORMAL
VTG PRED: NORMAL
VTG: NORMAL

## 2024-04-10 PROCEDURE — 94729 DIFFUSING CAPACITY: CPT

## 2024-04-10 PROCEDURE — 94726 PLETHYSMOGRAPHY LUNG VOLUMES: CPT

## 2024-04-10 PROCEDURE — 94760 N-INVAS EAR/PLS OXIMETRY 1: CPT

## 2024-04-10 PROCEDURE — 94010 BREATHING CAPACITY TEST: CPT

## 2024-04-10 ASSESSMENT — PULMONARY FUNCTION TESTS
FEV1/FVC_PRE: 72
FEV1_PERCENT_PREDICTED_PRE: 86

## 2024-04-11 NOTE — PROCEDURES
Pulmonary Function Testing      Patient name:  Catrachita Burleson      Unit #:   [id number]   Date of test: 4/10/2024  Date of interpretation:   4/11/2024    Ms. Catrachita Burleson is a 29 y.o. year-old non smoker. The spirometry data were acceptable and reproducible.     Spirometry:  Flow volume loops were normal. The FEV-1/FVC ratio was normal. The   prebronchodilator  FEV-1 was 3.10 liters (86% of predicted), which was normal. The FVC was 4.31 liters (100% of predicted), which was normal. Response to inhaled bronchodilators (albuterol) was not performed.    Lung volumes:  Lung volumes were tested by plethysmography. The total lung capacity was 8.45 liters (142% of predicted), which was increased. The residual volume was 3.76 liters (198% of predicted), which was increased. The ratio of residual volume to total lung capacity (RV/TLC) was 150, which was increased. Specific airway resistance was increased.    Diffusion capacity was found to be normal.       Interpretation:  Hyperinflation noted.  Normal spirometry and diffusion capacity.  Clinical correlation recommended.      Itzel Goel MD  [facility]   [address] , [address], [place] [address]